# Patient Record
Sex: MALE
[De-identification: names, ages, dates, MRNs, and addresses within clinical notes are randomized per-mention and may not be internally consistent; named-entity substitution may affect disease eponyms.]

---

## 2018-10-15 ENCOUNTER — TRANSCRIPTION ENCOUNTER (OUTPATIENT)
Age: 63
End: 2018-10-15

## 2021-04-14 ENCOUNTER — APPOINTMENT (OUTPATIENT)
Dept: DISASTER EMERGENCY | Facility: OTHER | Age: 66
End: 2021-04-14
Payer: MEDICARE

## 2021-04-14 PROCEDURE — 0012A: CPT

## 2021-09-14 ENCOUNTER — TRANSCRIPTION ENCOUNTER (OUTPATIENT)
Age: 66
End: 2021-09-14

## 2021-09-17 ENCOUNTER — EMERGENCY (EMERGENCY)
Facility: HOSPITAL | Age: 66
LOS: 1 days | Discharge: HOME | End: 2021-09-17
Attending: EMERGENCY MEDICINE
Payer: MEDICARE

## 2021-09-17 VITALS
SYSTOLIC BLOOD PRESSURE: 155 MMHG | HEART RATE: 100 BPM | RESPIRATION RATE: 18 BRPM | OXYGEN SATURATION: 95 % | TEMPERATURE: 98 F | DIASTOLIC BLOOD PRESSURE: 95 MMHG

## 2021-09-17 PROCEDURE — 99283 EMERGENCY DEPT VISIT LOW MDM: CPT | Mod: GC

## 2021-09-17 PROCEDURE — 99283 EMERGENCY DEPT VISIT LOW MDM: CPT

## 2021-09-17 RX ORDER — OXYCODONE AND ACETAMINOPHEN 5; 325 MG/1; MG/1
2 TABLET ORAL ONCE
Refills: 0 | Status: DISCONTINUED | OUTPATIENT
Start: 2021-09-17 | End: 2021-09-17

## 2021-09-17 RX ADMIN — OXYCODONE AND ACETAMINOPHEN 2 TABLET(S): 5; 325 TABLET ORAL at 06:32

## 2021-09-17 NOTE — ED PROVIDER NOTE - CLINICAL SUMMARY MEDICAL DECISION MAKING FREE TEXT BOX
66M limited PMH with chronic LBP and sciatica, been worse as of late oscar the last week. Pain meds given (already on steroid) and referral to ortho spine given. Patient is now feeling improved and workup is unremarkable for any emergent process.   Patient/family was given full return precautions.  Patient was counseled on red flag symptoms such as fever, severe pain, or focal deficits and advised to return to the ED for these reasons or any reason that was concerning to them.  Patient/ family advised to make close follow up with their primary care provider and specialty clinics (as applicable) to follow up with this visit and continue investigation/treatment.  All questions were answered. Patient/family has shown adequate competence and understanding. Patient/family is agreeable to the plan.

## 2021-09-17 NOTE — ED PROVIDER NOTE - NS ED ROS FT
General: No fever, no massive bleeding  Head: No HA, no ongoing scalp bleed  ENT: no neck pain, no sore throat  Resp: No SOB, no hemoptysis  Cardiovascular: No CP, No LOC  GI: No abdominal pain, No blood in stool  : No dysuria, no hematuria   MSK: + back pain,+ rt limb pain  Skin: No painful or bleeding lesions  Neuro: No paresthesias, No focal deficits

## 2021-09-17 NOTE — ED PROVIDER NOTE - CARE PROVIDER_API CALL
Luis Daniel Whitney (DO)  Orthopaedic Surgery  [address] [place] [address], [address]  [place] [address]  Phone: [phone]  Fax: [phone]  Follow Up Time: 4-6 Days

## 2021-09-17 NOTE — ED ADULT TRIAGE NOTE - CHIEF COMPLAINT QUOTE
Pt c/o R hip pain that is chronic and sciatica pain. Pt states that he has been seeing doctors for this problem and was supposed to follow up with an orthopedist but was unable to. States that the pain has been getting worse and that he is concerned that his medication is not working.

## 2021-09-17 NOTE — ED ADULT NURSE NOTE - OBJECTIVE STATEMENT
c/o atraumatic R lower back pain radiating into R hip and down R leg. hx sciatica. seen at urgent care- placed on medrol dose pack. patient states "it's not working so I stopped" awaiting MD evaluation. will ctm

## 2021-09-17 NOTE — ED PROVIDER NOTE - OBJECTIVE STATEMENT
66M limited PMH with chronic LBP and sciatica, been worse as of late oscar the last week.  PCM gave steroid taper which states isn't helping but not taking anything else.  Has not followed up with ortho.  Otherwise denies pain elsewhere, incontinence, saddle anesthesia, bleeding, SOB, chest pain, abdominal pain or fevers.  Denies other pertinent medical problems.  Denies any  substance use.

## 2021-09-17 NOTE — ED PROVIDER NOTE - PHYSICAL EXAMINATION
General: NAD, well appearing  HEENT: Normocephalic, EOM intact  Neck: No apparent stiffness or JVD  Pulm: Chest wall symmetric and nontender, lungs clear to ascultation   Cardiac: Regular rate and regular rhythm  Abdomen: Nontender and nondistended  Skin: Skin is warm, dry and intact without rashes or lesions.  Neuro: No apparent motor or sensory deficits, ambulatory, normal reflexes  Psych: Alert, oriented, and cooperative

## 2021-09-27 ENCOUNTER — APPOINTMENT (OUTPATIENT)
Dept: ORTHOPEDIC SURGERY | Facility: CLINIC | Age: 66
End: 2021-09-27
Payer: MEDICARE

## 2021-09-27 VITALS — BODY MASS INDEX: 30.06 KG/M2 | WEIGHT: 210 LBS | HEIGHT: 70 IN

## 2021-09-27 DIAGNOSIS — Z83.3 FAMILY HISTORY OF DIABETES MELLITUS: ICD-10-CM

## 2021-09-27 DIAGNOSIS — Z78.9 OTHER SPECIFIED HEALTH STATUS: ICD-10-CM

## 2021-09-27 DIAGNOSIS — Z80.1 FAMILY HISTORY OF MALIGNANT NEOPLASM OF TRACHEA, BRONCHUS AND LUNG: ICD-10-CM

## 2021-09-27 DIAGNOSIS — M54.16 RADICULOPATHY, LUMBAR REGION: ICD-10-CM

## 2021-09-27 PROCEDURE — 72100 X-RAY EXAM L-S SPINE 2/3 VWS: CPT | Mod: 26

## 2021-09-27 PROCEDURE — 99204 OFFICE O/P NEW MOD 45 MIN: CPT | Mod: 25

## 2021-09-27 PROCEDURE — 20610 DRAIN/INJ JOINT/BURSA W/O US: CPT | Mod: RT

## 2021-09-27 NOTE — REASON FOR VISIT
[Initial Visit] : an initial visit for [Back Pain] : back pain [Radiculopathy] : radiculopathy [Other: ____] : [unfilled] [FreeTextEntry2] : lower back pain radiating into both legs

## 2021-09-27 NOTE — HISTORY OF PRESENT ILLNESS
[de-identified] : Devaughn is a 65 y/o male here with is wife for evaluation of chronic lumbar radiculopathy, noting he had falling on cement 14 years ago and since then he's had chronic low back, right hip pain radiating down right leg laterally. He notes pain in the left leg also, but not as severe and he's had chronic osteoarthritis since teenage years. He's had no conservative management, recent episode of pain happened 3 weeks ago without any known injury.  He was seen in the ED given Percocet which helped but stating not enough, and was also given medrol dose pack which didn't have any effect.  He notes pain in the right hip joint is worse than anywhere else and was told 14 years ago that his hip arthritis is severe.

## 2021-09-27 NOTE — DISCUSSION/SUMMARY
[de-identified] : Devaughn has been referred to PT for stretching, core strengthening exercises.  He has also been referred to Dr. Luna for right hip evaluation. Advised to attend PT for 6-8 weeks, if Dr. Luna determines right hip pain not contributing from the hip joint itself, rather from spine perspective and PT does not provide any pain review in the next 4-6 weeks, advised repeat visit for evaluation, at which point we can determine injection therapy vs MRI lumbar spine.

## 2021-09-27 NOTE — PROCEDURE
[de-identified] : right hip bursa injection give using 25g needle, skin prepped with chloraprep, medication with 1cc lidocaine and methylprednisone, needle retracted no blood products seen, patient tolerated injection.

## 2021-09-27 NOTE — PHYSICAL EXAM
[de-identified] : CONSTITUTIONAL: The patient is a very pleasant individual who is well-nourished and who appears stated age.\par PSYCHIATRIC: The patient is alert and oriented X 3 and in no apparent distress, and participates with orthopedic evaluation well.\par HEAD: Atraumatic and is nonsyndromic in appearance.\par EENT: No visible thyromegaly, EOMI.\par RESPIRATORY: Respiratory rate is regular, not dyspneic on examination.\par LYMPHATICS: There is no inguinal lymphadenopathy\par INTEGUMENTARY: Skin is clean, dry, and intact about the bilateral lower extremities and lumbar spine.\par VASCULAR: There is brisk capillary refill about the bilateral lower extremities.\par NEUROLOGIC: There are no pathologic reflexes. There is no decrease in sensation of the bilateral lower extremities on Wartenberg pinwheel/manual examination. Deep tendon reflexes are well maintained at 2+/4 of the bilateral lower extremities and are symmetric..\par MUSCULOSKELETAL: There is no visible muscular atrophy. Manual motor strength is well maintained in the bilateral lower extremities. Range of motion of lumbar spine is well maintained. The patient ambulates in a non-myelopathic manner. Negative tension sign and straight leg raise bilaterally. Quad extension, ankle dorsiflexion, EHL, plantar flexion, and ankle eversion are well preserved. Normal secondary orthopaedic exam of bilateral hips, greater trochanteric area, knees and ankles\par \par pain with palpation to the right hip busa, and pain with right hip external and internal rotation.  [de-identified] : lumbar xray today 9/27/21: lumbar degenerative disc disease at L4-5 and more severe at L5-S1.

## 2021-10-04 ENCOUNTER — APPOINTMENT (OUTPATIENT)
Dept: ORTHOPEDIC SURGERY | Facility: CLINIC | Age: 66
End: 2021-10-04
Payer: MEDICARE

## 2021-10-04 VITALS
WEIGHT: 210 LBS | HEIGHT: 70 IN | DIASTOLIC BLOOD PRESSURE: 95 MMHG | TEMPERATURE: 98.1 F | SYSTOLIC BLOOD PRESSURE: 145 MMHG | HEART RATE: 102 BPM | BODY MASS INDEX: 30.06 KG/M2

## 2021-10-04 PROCEDURE — 73502 X-RAY EXAM HIP UNI 2-3 VIEWS: CPT

## 2021-10-04 PROCEDURE — 99214 OFFICE O/P EST MOD 30 MIN: CPT

## 2021-10-04 NOTE — REVIEW OF SYSTEMS
[Joint Pain] : joint pain [Joint Stiffness] : joint stiffness [Joint Swelling] : joint swelling [Feeling Tired] : fatigue [Negative] : Heme/Lymph [FreeTextEntry9] : right hip/right LE

## 2021-10-04 NOTE — END OF VISIT
[FreeTextEntry3] : I, Heber Stapleton, acted solely as a scribe for Dr. Carlton Luna on this date 10/04/2021.

## 2021-10-04 NOTE — REASON FOR VISIT
[Initial Visit] : an initial visit for [Hip Pain] : hip pain [Spouse] : spouse [FreeTextEntry2] : Right hip pain

## 2021-10-04 NOTE — DISCUSSION/SUMMARY
[Medication Risks Reviewed] : Medication risks reviewed [de-identified] : 67 y/o M with severe radiculopathy pain. He has mild to moderate right hip osteoarthritis. We explained to him that his pain is due to his back and not his hip. He has been in severe pain for 5 weeks. We discussed going to the ER for his acute severe pain where they may or may not admit him for pain management. He is not interested in going to the hospital. He will go for an MRI of the lumbar spine to rule out an acute herniated disc. We sent him a rx for Percocet and Ibuprofen 800 mg. He understands that we will not be able to continue to prescribe Percocet. He will f/u after the MRI. Depending on the findings of the MRI will determine the next steps for his care.

## 2021-10-04 NOTE — HISTORY OF PRESENT ILLNESS
[Pain Location] : pain [5] : a minimum pain level of 5/10 [10] : a maximum pain level of 10/10 [Sitting] : worsened by sitting [Walking] : worsened by walking [Rest] : relieved by rest [___ wks] : [unfilled] week(s) ago [NSAIDs] : relieved by nonsteroidal anti-inflammatory drugs [de-identified] : 67 y/o M presents with right hip pain. He has pain with walking, lying down, and sitting. Pt reports a fall injury 14 years ago which caused back injury. He had chronic pain for 14 years, but notes that about 5 weeks ago the pain got severe. He has been using a walker for 1 month. He noted right hip pain and pain in the front of the knee as well as pain in the front of the shin. He is taking Aleve. He reports change in BM relating to constipation with acute lower back pain. He took several stool softeners and has a BM and noted some pain relief. Pt went to ED for severe pain for 2 weeks ago, he was given Percocet steroid summer and NSAIDs. He had a steroid injection the hip last week without relief. He reports right LE numbness. He never had a back MRI.  [de-identified] : lying down

## 2021-10-04 NOTE — PHYSICAL EXAM
[LE] : Sensory: Intact in bilateral lower extremities [ALL] : dorsalis pedis, posterior tibial, femoral, popliteal, and radial 2+ and symmetric bilaterally [Normal] : Alert and in no acute distress [Walker] : ambulates with walker [Poor Appearance] : well-appearing [de-identified] : GENERAL APPEARANCE: Well nourished and hydrated, pleasant, alert, and oriented x 3. Appears their stated age. \par HEENT: Normocephalic, extraocular eye motion intact. Nasal septum midline. Oral cavity clear. External auditory canal clear. \par RESPIRATORY: Breath sounds clear and audible in all lobes. No wheezing, No accessory muscle use.\par CARDIOVASCULAR: No apparent abnormalities. No lower leg edema. No varicosities. Pedal pulses are palpable.\par NEUROLOGIC: Sensation is normal, no muscle weakness in the upper or lower extremities.\par DERMATOLOGIC: No apparent skin lesions, moist, warm, no rash.\par SPINE: Cervical spine appears normal and moves freely; thoracic spine appears normal and moves freely; lumbosacral spine appears normal and moves freely, normal, nontender.\par MUSCULOSKELETAL: Hands, wrists, and elbows are normal and move freely, shoulders are normal and move freely.  [de-identified] : Severe pain with back ROM [de-identified] : 3V xray of the right hip done in the office today and reviewed by Dr. Carlton Luna demonstrates mild to moderate osteoarthritis

## 2021-10-12 ENCOUNTER — APPOINTMENT (OUTPATIENT)
Dept: MRI IMAGING | Facility: CLINIC | Age: 66
End: 2021-10-12
Payer: MEDICARE

## 2021-10-12 ENCOUNTER — OUTPATIENT (OUTPATIENT)
Dept: OUTPATIENT SERVICES | Facility: HOSPITAL | Age: 66
LOS: 1 days | End: 2021-10-12

## 2021-10-12 DIAGNOSIS — M54.16 RADICULOPATHY, LUMBAR REGION: ICD-10-CM

## 2021-10-12 PROCEDURE — 72148 MRI LUMBAR SPINE W/O DYE: CPT | Mod: 26

## 2021-10-22 ENCOUNTER — APPOINTMENT (OUTPATIENT)
Dept: ORTHOPEDIC SURGERY | Facility: CLINIC | Age: 66
End: 2021-10-22
Payer: MEDICARE

## 2021-10-22 VITALS — WEIGHT: 210 LBS | HEIGHT: 70 IN | BODY MASS INDEX: 30.06 KG/M2

## 2021-10-22 DIAGNOSIS — M40.209 UNSPECIFIED KYPHOSIS, SITE UNSPECIFIED: ICD-10-CM

## 2021-10-22 DIAGNOSIS — M47.814 SPONDYLOSIS W/OUT MYELOPATHY OR RADICULOPATHY, THORACIC REGION: ICD-10-CM

## 2021-10-22 DIAGNOSIS — M48.07 SPINAL STENOSIS, LUMBOSACRAL REGION: ICD-10-CM

## 2021-10-22 DIAGNOSIS — M54.6 PAIN IN THORACIC SPINE: ICD-10-CM

## 2021-10-22 DIAGNOSIS — M43.16 SPONDYLOLISTHESIS, LUMBAR REGION: ICD-10-CM

## 2021-10-22 PROCEDURE — 99214 OFFICE O/P EST MOD 30 MIN: CPT

## 2021-10-22 PROCEDURE — 72070 X-RAY EXAM THORAC SPINE 2VWS: CPT

## 2021-10-22 NOTE — HISTORY OF PRESENT ILLNESS
[de-identified] : Mr. MIRYAM FINCH  is a 66 year old male who presents with 7 weeks of right lumbar radiculopathy without any specific cause or trauma.  He has low back pain that radiates down from the back into the right hip, lateral thigh, and lateral calf.  He has done 2 medrol dose packs without any relief.  He takes 2 oxycodone for symptom control.  Denies any LE radicular symptoms.  Normal bowel and bladder control.   Denies any recent fevers, chills, sweats, weight loss, or infection.\par \par The patients past medical history, past surgical history, medications, allergies, and social history were reviewed by me today with the patient and documented accordingly.  In addition, the patient's family history, which is noncontributory to their visit, was also reviewed.\par

## 2021-10-22 NOTE — DISCUSSION/SUMMARY
[de-identified] : We discussed further treatment options both nonsurgical and surgical.  We discussed the role of a lumbar laminectomy and fusion.  He is not interested in surgical intervention at this time.  He would like to be evaluated for possible epidural injections.  Referral was given.  Follow-up afterwards or sooner with any changes or worsening of his symptoms.

## 2021-10-22 NOTE — PHYSICAL EXAM
[Antalgic] : antalgic [Stooped] : stooped [Walker] : ambulates with walker [de-identified] : Examination of the thoracic and lumbar spine reveals no midline tenderness palpation, step-offs, or skin lesions. Decreased range of motion with respect to flexion, extension, lateral bending, and rotation. No tenderness to palpation of the sciatic notch. No tenderness palpation of the bilateral greater trochanters. No pain with passive internal/external rotation of the hips. No instability of bilateral lower extremities.  Negative TABBY. Negative straight leg raise bilaterally. No bowstring. Negative femoral stretch. 5 out of 5 iliopsoas, hip abductors, hips adductors, quadriceps, hamstrings, gastrocsoleus, tibialis anterior, extensor hallucis longus, peroneals. Grossly intact sensation to light touch bilateral lower extremities. 1+ patellar and Achilles reflexes. Downgoing Babinski. No clonus. Intact proprioception. Palpable pulses. No skin lesion and no edema on the right and left lower extremities. [de-identified] : Lumbar MRI reveals L4-5 spondylolisthesis with some increased lateral recess and foraminal stenosis\par \par AP lateral thoracic x-rays with some spondylosis and kyphosis.

## 2021-12-28 ENCOUNTER — NON-APPOINTMENT (OUTPATIENT)
Age: 66
End: 2021-12-28

## 2021-12-28 ENCOUNTER — APPOINTMENT (OUTPATIENT)
Dept: INTERNAL MEDICINE | Facility: CLINIC | Age: 66
End: 2021-12-28
Payer: MEDICARE

## 2021-12-28 VITALS
HEIGHT: 70 IN | HEART RATE: 113 BPM | WEIGHT: 195 LBS | TEMPERATURE: 96.2 F | DIASTOLIC BLOOD PRESSURE: 88 MMHG | OXYGEN SATURATION: 96 % | SYSTOLIC BLOOD PRESSURE: 126 MMHG | BODY MASS INDEX: 27.92 KG/M2

## 2021-12-28 DIAGNOSIS — M54.16 RADICULOPATHY, LUMBAR REGION: ICD-10-CM

## 2021-12-28 DIAGNOSIS — Z82.49 FAMILY HISTORY OF ISCHEMIC HEART DISEASE AND OTHER DISEASES OF THE CIRCULATORY SYSTEM: ICD-10-CM

## 2021-12-28 DIAGNOSIS — Z00.00 ENCOUNTER FOR GENERAL ADULT MEDICAL EXAMINATION W/OUT ABNORMAL FINDINGS: ICD-10-CM

## 2021-12-28 DIAGNOSIS — Z80.52 FAMILY HISTORY OF MALIGNANT NEOPLASM OF BLADDER: ICD-10-CM

## 2021-12-28 PROCEDURE — 36415 COLL VENOUS BLD VENIPUNCTURE: CPT

## 2021-12-28 PROCEDURE — 93000 ELECTROCARDIOGRAM COMPLETE: CPT

## 2021-12-28 PROCEDURE — G0438: CPT

## 2021-12-28 RX ORDER — OXYCODONE AND ACETAMINOPHEN 5; 325 MG/1; MG/1
5-325 TABLET ORAL
Qty: 20 | Refills: 0 | Status: DISCONTINUED | COMMUNITY
Start: 2021-10-04 | End: 2021-12-28

## 2021-12-28 RX ORDER — NAPROXEN SODIUM 220 MG
TABLET ORAL
Refills: 0 | Status: DISCONTINUED | COMMUNITY
End: 2021-12-28

## 2021-12-28 RX ORDER — METHYLPREDNISOLONE 4 MG/1
4 TABLET ORAL
Qty: 1 | Refills: 0 | Status: DISCONTINUED | COMMUNITY
Start: 2021-10-15 | End: 2021-12-28

## 2021-12-28 RX ORDER — IBUPROFEN 800 MG/1
800 TABLET, FILM COATED ORAL 3 TIMES DAILY
Qty: 60 | Refills: 1 | Status: DISCONTINUED | COMMUNITY
Start: 2021-10-04 | End: 2021-12-28

## 2021-12-28 RX ORDER — TIZANIDINE 4 MG/1
4 TABLET ORAL
Qty: 60 | Refills: 0 | Status: DISCONTINUED | COMMUNITY
Start: 2021-10-25

## 2021-12-29 DIAGNOSIS — Z12.5 ENCOUNTER FOR SCREENING FOR MALIGNANT NEOPLASM OF PROSTATE: ICD-10-CM

## 2021-12-29 DIAGNOSIS — Z13.220 ENCOUNTER FOR SCREENING FOR LIPOID DISORDERS: ICD-10-CM

## 2021-12-29 LAB
ALBUMIN SERPL ELPH-MCNC: 4.6 G/DL
ALP BLD-CCNC: 149 U/L
ALT SERPL-CCNC: 17 U/L
ANION GAP SERPL CALC-SCNC: 18 MMOL/L
AST SERPL-CCNC: 13 U/L
BASOPHILS # BLD AUTO: 0.12 K/UL
BASOPHILS NFR BLD AUTO: 1.2 %
BILIRUB SERPL-MCNC: 0.9 MG/DL
BUN SERPL-MCNC: 12 MG/DL
CALCIUM SERPL-MCNC: 10.2 MG/DL
CHLORIDE SERPL-SCNC: 102 MMOL/L
CHOLEST SERPL-MCNC: 271 MG/DL
CO2 SERPL-SCNC: 20 MMOL/L
CREAT SERPL-MCNC: 0.59 MG/DL
EOSINOPHIL # BLD AUTO: 0.08 K/UL
EOSINOPHIL NFR BLD AUTO: 0.8 %
ESTIMATED AVERAGE GLUCOSE: 303 MG/DL
GLUCOSE SERPL-MCNC: 305 MG/DL
HBA1C MFR BLD HPLC: 12.2 %
HCT VFR BLD CALC: 50 %
HDLC SERPL-MCNC: 43 MG/DL
HGB BLD-MCNC: 17 G/DL
IMM GRANULOCYTES NFR BLD AUTO: 0.6 %
LDLC SERPL CALC-MCNC: 156 MG/DL
LYMPHOCYTES # BLD AUTO: 2.16 K/UL
LYMPHOCYTES NFR BLD AUTO: 21.8 %
MAN DIFF?: NORMAL
MCHC RBC-ENTMCNC: 31.8 PG
MCHC RBC-ENTMCNC: 34 GM/DL
MCV RBC AUTO: 93.6 FL
MONOCYTES # BLD AUTO: 0.68 K/UL
MONOCYTES NFR BLD AUTO: 6.9 %
NEUTROPHILS # BLD AUTO: 6.8 K/UL
NEUTROPHILS NFR BLD AUTO: 68.7 %
NONHDLC SERPL-MCNC: 228 MG/DL
PLATELET # BLD AUTO: 304 K/UL
POTASSIUM SERPL-SCNC: 4.2 MMOL/L
PROT SERPL-MCNC: 6.9 G/DL
PSA SERPL-MCNC: 5.76 NG/ML
RBC # BLD: 5.34 M/UL
RBC # FLD: 13.7 %
SODIUM SERPL-SCNC: 140 MMOL/L
TRIGL SERPL-MCNC: 359 MG/DL
WBC # FLD AUTO: 9.9 K/UL

## 2021-12-29 RX ORDER — ASPIRIN 81 MG/1
81 TABLET, COATED ORAL
Refills: 0 | Status: ACTIVE | COMMUNITY
Start: 2021-12-29

## 2021-12-29 NOTE — ASSESSMENT
[FreeTextEntry1] : -PMH: Generalized OA (Pre-dominantly b/l Knee's), DDD (Mod R. L4-L5 Foraminal Narrowing)\par -SH: Significant other. No children. Current smoker. Occasional EtOH use. \par \par MIRYAM is a 66 year M whom is here today for an annual well check and to establish care w/ a new PMD\par \par Specialists Involved:\par -Prior PMD: Last seen 5-6 yrs ago\par -Pain Mgt: Dr. Medina \par \par EKG obtained in office today demonstrates Sinus Tachycardia. normal axis/Intervals. Poor-R-wave progression. Normal EKG. Cardio consult advuised\par \par -F/u labs drawn in office today\par -Further recs pending lab results\par -Call Ins Co regarding Shingles & PPSV23 vaccine coverage\par -F/u w/ Gi (Colonoscopy)\par -F/u Lung CA CT Screening\par -RTO 6mo for routine f/u & labs or sooner if needed

## 2021-12-29 NOTE — HEALTH RISK ASSESSMENT
[Very Good] : ~his/her~  mood as very good [Current] : Current [Reviewed no changes] : Reviewed, no changes [20 or more] : 20 or more [Yes] : Yes [Monthly or less (1 pt)] : Monthly or less (1 point) [1 or 2 (0 pts)] : 1 or 2 (0 points) [Never (0 pts)] : Never (0 points) [No] : In the past 12 months have you used drugs other than those required for medical reasons? No [No falls in past year] : Patient reported no falls in the past year [0] : 2) Feeling down, depressed, or hopeless: Not at all (0) [PHQ-2 Negative - No further assessment needed] : PHQ-2 Negative - No further assessment needed [HIV test declined] : HIV test declined [Hepatitis C test declined] : Hepatitis C test declined [None] : None [With Significant Other] : lives with significant other [Retired] : retired [Significant Other] : lives with significant other [# Of Children ___] : has [unfilled] children [Fully functional (bathing, dressing, toileting, transferring, walking, feeding)] : Fully functional (bathing, dressing, toileting, transferring, walking, feeding) [Fully functional (using the telephone, shopping, preparing meals, housekeeping, doing laundry, using] : Fully functional and needs no help or supervision to perform IADLs (using the telephone, shopping, preparing meals, housekeeping, doing laundry, using transportation, managing medications and managing finances) [Audit-CScore] : 1 [[facility]] : 10 [QPN7Omlqv] : 0 [Change in mental status noted] : No change in mental status noted [Reports changes in hearing] : Reports no changes in hearing [Reports changes in vision] : Reports no changes in vision [ColonoscopyComments] : GI referral given today [AdvancecareDate] : 12/21

## 2021-12-29 NOTE — ADDENDUM
[FreeTextEntry1] : Elevated alkaline phosphatase level likely due to fatty liver disease. We'll repeat CMP along with a GGT at next visit\par \par Patient not seen by a primary care doctor in several years\par Labs obtained in the office yesterday demonstrate poorly controlled type 2 diabetes with an A1c of 12.2 indicating average blood glucose of 300.\par Start metformin 500 mg b.i.d.\par Start Jardiance 10 mg q.d.\par Start aspirin 81 mg once daily for primary prevention of cardiovascular disease given the increased risk associated with diabetes\par Start Crestor 10 mg h.s. to lower cholesterol given known increased cardiovascular risk associated with diabetes\par \par Diabetic educator referral strongly recommended. Will send patient's diabetic glucose monitoring supplies to his pharmacy. I would like for him to start checking at least once a day in the morning before eating anything. Goes to achieve fasting blood glucose of less than 130.\par \par Diet and weight loss strongly advised\par \par Patient's PSA value also elevated. We will repeat this at next followup visit\par \par Pt to f/u w/ me in 2-4 weeks to address these new meds and lab findings. Sooner if needed

## 2021-12-29 NOTE — HISTORY OF PRESENT ILLNESS
[FreeTextEntry1] : establish care\par annual well check  [de-identified] : -PMH: Generalized OA (Pre-dominantly b/l Knee's), DDD (Mod R. L4-L5 Foraminal Narrowing)\par -SH: Significant other. No children. Current smoker. Occasional EtOH use. \par \par MIRYAM is a 66 year M whom is here today for an annual well check and to establish care w/ a new PMD\par Today, pt reports feeling well but mentions that he has been having epigastric abdominal for the past 2mo. Pain is constant. Worsened since onset. Symptoms are exacerbated by spicy foods, italian foods. He also reports heart burn related symptoms. Has tried OTC antacids w/ minimal relief. Never had anything like this before. No alleviating symptoms. Pain is not worsened with exertion. Admits to darkened stools. He does admit tthat due to his low back pain and knee OA he has been on a lot of PO NSAIDs & Steroids\par \par Specialists Involved:\par -Prior PMD: Last seen 5-6 yrs ago\par -Pain Mgt: Dr. Medina \par \par -Vaccines: Needs PPSV 23, Shingles, TDap (Declines at this time)\par -Colonoscopy: 2011. GI referral given today\par -Lung CT CA Screening: Script given today\par -FH of Prostate, Colon, breast or ovarian CA: None known\par \par -Generalized OA (Pre-dominantly b/l Knee's), DDD (Mod R. L4-L5 Foraminal Narrowing): S/p Epidural Injection. Following w/ Pain Mgt. \par -Long Standing Smoking Hx: On and off over the last 40yrs. Currently quit 1mo ago.

## 2022-01-17 ENCOUNTER — APPOINTMENT (OUTPATIENT)
Dept: GASTROENTEROLOGY | Facility: CLINIC | Age: 67
End: 2022-01-17
Payer: MEDICARE

## 2022-01-17 VITALS
HEIGHT: 70 IN | DIASTOLIC BLOOD PRESSURE: 90 MMHG | SYSTOLIC BLOOD PRESSURE: 130 MMHG | HEART RATE: 100 BPM | WEIGHT: 190 LBS | BODY MASS INDEX: 27.2 KG/M2

## 2022-01-17 DIAGNOSIS — Z86.39 PERSONAL HISTORY OF OTHER ENDOCRINE, NUTRITIONAL AND METABOLIC DISEASE: ICD-10-CM

## 2022-01-17 DIAGNOSIS — Z78.9 OTHER SPECIFIED HEALTH STATUS: ICD-10-CM

## 2022-01-17 DIAGNOSIS — F17.210 NICOTINE DEPENDENCE, CIGARETTES, UNCOMPLICATED: ICD-10-CM

## 2022-01-17 PROCEDURE — 99204 OFFICE O/P NEW MOD 45 MIN: CPT

## 2022-01-17 NOTE — HISTORY OF PRESENT ILLNESS
[None] : had no significant interval events [Heartburn] : denies heartburn [Nausea] : denies nausea [Vomiting] : denies vomiting [Diarrhea] : denies diarrhea [Constipation] : denies constipation [Yellow Skin Or Eyes (Jaundice)] : denies jaundice [Abdominal Swelling] : denies abdominal swelling [Rectal Pain] : denies rectal pain [Abdominal Pain] : abdominal pain [Wt Gain ___ Lbs] : no recent weight gain [Wt Loss ___ Lbs] : no recent weight loss [GERD] : no gastroesophageal reflux disease [Hiatus Hernia] : no hiatus hernia [Peptic Ulcer Disease] : no peptic ulcer disease [Pancreatitis] : no pancreatitis [Cholelithiasis] : no cholelithiasis [Kidney Stone] : no kidney stone [Inflammatory Bowel Disease] : no inflammatory bowel disease [Irritable Bowel Syndrome] : no irritable bowel syndrome [Diverticulitis] : no diverticulitis [Alcohol Abuse] : no alcohol abuse [Malignancy] : no malignancy [Abdominal Surgery] : no abdominal surgery [Appendectomy] : no appendectomy [Cholecystectomy] : no cholecystectomy [de-identified] : This is the patient's first visit here [de-identified] : Patient presents for initial evaluation of chronic epigastric pain which has been present for the past 3 to 4 months.  He has been having issues with his lower back and has been on various nonsteroidal anti-inflammatory agents and may very well have an NSAID induced ulcer or gastropathy.  He has had no previous upper endoscopies but did have a negative colonoscopy done roughly 8 years ago.  He has no lower GI symptoms or complaints and presently denies melena or hematochezia or nausea or vomiting or hematemesis.  He is presently taking omeprazole 40 mg twice daily with some relief of his epigastric pain which still persists.  He has no associated reflux symptoms.

## 2022-01-17 NOTE — ASSESSMENT
[FreeTextEntry1] : Impression: Chronic epigastric pain with recent NSAID use rule out NSAID induced gastropathy and/or ulcer disease.\par \par Recommendations: Upper endoscopy was advised for further evaluation of the above.  The risk versus benefits of upper endoscopy and intravenous sedation, and alternative testing such as upper GI series, were individually explained to the patient today who appeared to understand all of the above and was agreeable to proceeding with upper endoscopy.  His ASA classification is 2 and he is medically optimized for the proposed upper endoscopy and appeared to understand all of the above instructions, information, and management plan.

## 2022-02-03 ENCOUNTER — APPOINTMENT (OUTPATIENT)
Dept: INTERNAL MEDICINE | Facility: CLINIC | Age: 67
End: 2022-02-03
Payer: MEDICARE

## 2022-02-03 ENCOUNTER — RESULT CHARGE (OUTPATIENT)
Age: 67
End: 2022-02-03

## 2022-02-03 VITALS
HEIGHT: 70 IN | RESPIRATION RATE: 16 BRPM | BODY MASS INDEX: 27.97 KG/M2 | TEMPERATURE: 97.2 F | HEART RATE: 91 BPM | OXYGEN SATURATION: 93 % | DIASTOLIC BLOOD PRESSURE: 98 MMHG | SYSTOLIC BLOOD PRESSURE: 148 MMHG | WEIGHT: 195.38 LBS

## 2022-02-03 DIAGNOSIS — Z86.79 PERSONAL HISTORY OF OTHER DISEASES OF THE CIRCULATORY SYSTEM: ICD-10-CM

## 2022-02-03 DIAGNOSIS — Z12.2 ENCOUNTER FOR SCREENING FOR MALIGNANT NEOPLASM OF RESPIRATORY ORGANS: ICD-10-CM

## 2022-02-03 PROCEDURE — 99214 OFFICE O/P EST MOD 30 MIN: CPT | Mod: 25

## 2022-02-03 PROCEDURE — 36415 COLL VENOUS BLD VENIPUNCTURE: CPT

## 2022-02-03 PROCEDURE — 82962 GLUCOSE BLOOD TEST: CPT

## 2022-02-03 NOTE — HISTORY OF PRESENT ILLNESS
[FreeTextEntry1] : f/u T2DM, HLD [de-identified] : -PMH: T2DM, HLD, Generalized OA (Pre-dominantly b/l Knee's), DDD (Mod R. L4-L5 Foraminal Narrowing)\par -SH: Significant other. No children. Current smoker. Occasional EtOH use. \par \par MIRYAM is a 66 year M whom is here today for f/u T2DM, HLD\par Today, pt reports that he continues to have epigastric abdominal pain. Is scheduled for an EGD in 2 weeks \par \par -Epigastric Abdominal Pain: In setting of recent NSAID use. Started on PPI TX 12/2021. GI Consult 1/17/22. Plan is EGD\par \par -T2DM: Now on metformin 500mg BID & Jardiance 10mg QD. On ASA & Statin. (12/2021) A1c 12.2. Was referred to Diabetic educator and provided w/ diabetic glucose monitoring supplies. Needs to consult w/ Optho. \par -HLD: Now on Crestor 10mg HS. \par \par -Generalized OA (Pre-dominantly b/l Knee's), DDD (Mod R. L4-L5 Foraminal Narrowing): S/p Epidural Injection. Following w/ Pain Mgt. \par -Long Standing Smoking Hx: On and off over the last 40yrs. Currently quit 1mo ago.

## 2022-02-03 NOTE — ASSESSMENT
[FreeTextEntry1] : -PMH: T2DM, HLD, Generalized OA (Pre-dominantly b/l Knee's), DDD (Mod R. L4-L5 Foraminal Narrowing)\par -SH: Significant other. No children. Current smoker. Occasional EtOH use. \par \par MIRYAM is a 66 year M whom is here today for f/u T2DM, HLD\par \par Specialists Involved:\par -Pain Mgt: Dr. Medina \par -GI: Dr. Miguel Bedoya \par -Cardio: Consult pending\par \par -F/u labs drawn in office today\par -Further recs pending lab results\par -Still needs to consult w/ Cardio (Sinus Tachycardia, DM, Smoker)\par -Still needs to obtain Lung CA CT Screening\par -Still needs to call Ins Co regarding Shingles & PPSV23 vaccine coverage\par -Continue f/u w/ GI (Colonoscopy, EGD, Epigastric Abd Pain)\par -RTO 2mo for routine f/u & labs or sooner if needed

## 2022-02-16 ENCOUNTER — TRANSCRIPTION ENCOUNTER (OUTPATIENT)
Age: 67
End: 2022-02-16

## 2022-02-17 ENCOUNTER — OUTPATIENT (OUTPATIENT)
Dept: OUTPATIENT SERVICES | Facility: HOSPITAL | Age: 67
LOS: 1 days | End: 2022-02-17
Payer: MEDICARE

## 2022-02-17 ENCOUNTER — RESULT REVIEW (OUTPATIENT)
Age: 67
End: 2022-02-17

## 2022-02-17 ENCOUNTER — APPOINTMENT (OUTPATIENT)
Dept: GASTROENTEROLOGY | Facility: GI CENTER | Age: 67
End: 2022-02-17
Payer: MEDICARE

## 2022-02-17 DIAGNOSIS — K20.80 OTHER ESOPHAGITIS WITHOUT BLEEDING: ICD-10-CM

## 2022-02-17 DIAGNOSIS — R10.13 EPIGASTRIC PAIN: ICD-10-CM

## 2022-02-17 LAB — GLUCOSE BLDC GLUCOMTR-MCNC: 189 MG/DL — HIGH (ref 70–99)

## 2022-02-17 PROCEDURE — 88305 TISSUE EXAM BY PATHOLOGIST: CPT

## 2022-02-17 PROCEDURE — 82962 GLUCOSE BLOOD TEST: CPT

## 2022-02-17 PROCEDURE — 88342 IMHCHEM/IMCYTCHM 1ST ANTB: CPT | Mod: 26

## 2022-02-17 PROCEDURE — 88305 TISSUE EXAM BY PATHOLOGIST: CPT | Mod: 26

## 2022-02-17 PROCEDURE — 43239 EGD BIOPSY SINGLE/MULTIPLE: CPT

## 2022-02-17 PROCEDURE — 88342 IMHCHEM/IMCYTCHM 1ST ANTB: CPT

## 2022-02-19 ENCOUNTER — RX RENEWAL (OUTPATIENT)
Age: 67
End: 2022-02-19

## 2022-02-23 LAB — SURGICAL PATHOLOGY STUDY: SIGNIFICANT CHANGE UP

## 2022-03-17 ENCOUNTER — APPOINTMENT (OUTPATIENT)
Dept: MRI IMAGING | Facility: CLINIC | Age: 67
End: 2022-03-17
Payer: MEDICARE

## 2022-03-17 ENCOUNTER — OUTPATIENT (OUTPATIENT)
Dept: OUTPATIENT SERVICES | Facility: HOSPITAL | Age: 67
LOS: 1 days | End: 2022-03-17

## 2022-03-17 DIAGNOSIS — M25.561 PAIN IN RIGHT KNEE: ICD-10-CM

## 2022-03-17 PROCEDURE — 73721 MRI JNT OF LWR EXTRE W/O DYE: CPT | Mod: 26,LT,MH,76

## 2022-03-22 ENCOUNTER — RX RENEWAL (OUTPATIENT)
Age: 67
End: 2022-03-22

## 2022-03-23 ENCOUNTER — RX RENEWAL (OUTPATIENT)
Age: 67
End: 2022-03-23

## 2022-03-28 ENCOUNTER — RX RENEWAL (OUTPATIENT)
Age: 67
End: 2022-03-28

## 2022-03-29 ENCOUNTER — RX RENEWAL (OUTPATIENT)
Age: 67
End: 2022-03-29

## 2022-04-05 ENCOUNTER — APPOINTMENT (OUTPATIENT)
Dept: ENDOCRINOLOGY | Facility: CLINIC | Age: 67
End: 2022-04-05
Payer: MEDICARE

## 2022-04-05 VITALS
RESPIRATION RATE: 16 BRPM | DIASTOLIC BLOOD PRESSURE: 95 MMHG | TEMPERATURE: 98.1 F | WEIGHT: 198 LBS | HEART RATE: 93 BPM | HEIGHT: 70 IN | BODY MASS INDEX: 28.35 KG/M2 | SYSTOLIC BLOOD PRESSURE: 151 MMHG | OXYGEN SATURATION: 96 %

## 2022-04-05 PROCEDURE — 99204 OFFICE O/P NEW MOD 45 MIN: CPT

## 2022-04-05 RX ORDER — LANCETS
EACH MISCELLANEOUS
Qty: 100 | Refills: 3 | Status: ACTIVE | COMMUNITY
Start: 2022-04-05 | End: 1900-01-01

## 2022-04-05 NOTE — HISTORY OF PRESENT ILLNESS
[FreeTextEntry1] : 65 yo M with a dx of Type 2 DM x 3 mo\par Diet- low carb\par Ex- little, due to arthritis and sciatica\par Retinopathy- neg\par Neuropathy- feet feel numb

## 2022-04-05 NOTE — REVIEW OF SYSTEMS
[Recent Weight Loss (___ Lbs)] : recent weight loss: [unfilled] lbs [Erectile Dysfunction] : erectile dysfunction [Joint Stiffness] : joint stiffness [Back Pain] : back pain [Pain/Numbness of Digits] : pain/numbness of digits [Negative] : Heme/Lymph [Fatigue] : no fatigue [Decreased Appetite] : appetite not decreased [Recent Weight Gain (___ Lbs)] : no recent weight gain [Chest Pain] : no chest pain [Palpitations] : no palpitations [Polydipsia] : no polydipsia [FreeTextEntry2] : lost wt slowly over 2 yrs

## 2022-04-05 NOTE — ASSESSMENT
[Diabetic Medications] : Risks and benefits of diabetic medications were discussed [FreeTextEntry1] : 1- diet and ex discussed- will make appt with dietary\par 2-labs reviewed- A1c- 8.5, LDL- 156\par 3- cont Jardiance and increase metformin to 1000u bid\par 4- cont Crestor and low fat diet\par 5- Contour next meter given- check q AM\par \par Ret 3 mo\par \par

## 2022-04-07 ENCOUNTER — NON-APPOINTMENT (OUTPATIENT)
Age: 67
End: 2022-04-07

## 2022-04-07 ENCOUNTER — APPOINTMENT (OUTPATIENT)
Dept: INTERNAL MEDICINE | Facility: CLINIC | Age: 67
End: 2022-04-07

## 2022-04-07 VITALS
BODY MASS INDEX: 27.35 KG/M2 | SYSTOLIC BLOOD PRESSURE: 142 MMHG | HEART RATE: 100 BPM | OXYGEN SATURATION: 97 % | DIASTOLIC BLOOD PRESSURE: 90 MMHG | TEMPERATURE: 97 F | HEIGHT: 70 IN | WEIGHT: 191 LBS | RESPIRATION RATE: 16 BRPM

## 2022-04-07 RX ORDER — METFORMIN HYDROCHLORIDE 500 MG/1
500 TABLET, COATED ORAL
Qty: 180 | Refills: 0 | Status: DISCONTINUED | COMMUNITY
Start: 2021-12-29 | End: 2022-04-07

## 2022-04-07 RX ORDER — BLOOD-GLUCOSE METER
EACH MISCELLANEOUS
Qty: 30 | Refills: 0 | Status: DISCONTINUED | COMMUNITY
Start: 2022-02-03 | End: 2022-04-07

## 2022-04-07 RX ORDER — BLOOD-GLUCOSE METER
W/DEVICE EACH MISCELLANEOUS
Qty: 30 | Refills: 0 | Status: DISCONTINUED | COMMUNITY
Start: 2022-02-03 | End: 2022-04-07

## 2022-04-07 RX ORDER — BLOOD SUGAR DIAGNOSTIC
STRIP MISCELLANEOUS
Qty: 30 | Refills: 0 | Status: DISCONTINUED | COMMUNITY
Start: 2022-02-03 | End: 2022-04-07

## 2022-04-08 DIAGNOSIS — R74.8 ABNORMAL LEVELS OF OTHER SERUM ENZYMES: ICD-10-CM

## 2022-04-08 LAB
ALBUMIN SERPL ELPH-MCNC: 5 G/DL
ALP BLD-CCNC: 114 U/L
ALT SERPL-CCNC: 16 U/L
ANION GAP SERPL CALC-SCNC: 15 MMOL/L
AST SERPL-CCNC: 16 U/L
BILIRUB SERPL-MCNC: 1 MG/DL
BUN SERPL-MCNC: 13 MG/DL
CALCIUM SERPL-MCNC: 9.9 MG/DL
CHLORIDE SERPL-SCNC: 104 MMOL/L
CHOLEST SERPL-MCNC: 134 MG/DL
CO2 SERPL-SCNC: 23 MMOL/L
CREAT SERPL-MCNC: 0.68 MG/DL
EGFR: 101 ML/MIN/1.73M2
ESTIMATED AVERAGE GLUCOSE: 177 MG/DL
GGT SERPL-CCNC: 25 U/L
GLUCOSE SERPL-MCNC: 148 MG/DL
HBA1C MFR BLD HPLC: 7.8 %
HDLC SERPL-MCNC: 38 MG/DL
LDLC SERPL CALC-MCNC: 62 MG/DL
NONHDLC SERPL-MCNC: 96 MG/DL
POTASSIUM SERPL-SCNC: 4.1 MMOL/L
PROT SERPL-MCNC: 7.5 G/DL
SODIUM SERPL-SCNC: 142 MMOL/L
TRIGL SERPL-MCNC: 173 MG/DL

## 2022-04-11 ENCOUNTER — NON-APPOINTMENT (OUTPATIENT)
Age: 67
End: 2022-04-11

## 2022-04-15 ENCOUNTER — APPOINTMENT (OUTPATIENT)
Dept: ENDOCRINOLOGY | Facility: CLINIC | Age: 67
End: 2022-04-15
Payer: MEDICARE

## 2022-04-15 PROCEDURE — 97802 MEDICAL NUTRITION INDIV IN: CPT

## 2022-04-18 RX ORDER — LANCETS
EACH MISCELLANEOUS
Qty: 1 | Refills: 3 | Status: ACTIVE | COMMUNITY
Start: 2022-04-15 | End: 1900-01-01

## 2022-04-20 ENCOUNTER — RX RENEWAL (OUTPATIENT)
Age: 67
End: 2022-04-20

## 2022-05-05 NOTE — ED PROVIDER NOTE - ATTENDING CONTRIBUTION TO CARE
30-Oct-2021 I personally saw the patient with the PA, and completed the key components of the history and physical exam. I then discussed the management plan with the PA.   gen in nad resp clear cardiac no murmur abd soft neuro intact msk no midline back ttp   agree with pa plan of care I personally saw the patient with the resident, and completed the key components of the history and physical exam. I then discussed the management plan with the resident.   gen in nad resp clear cardiac no murmur abd soft neuro intact msk no midline back ttp   agree with pa plan of care

## 2022-05-13 ENCOUNTER — APPOINTMENT (OUTPATIENT)
Dept: ENDOCRINOLOGY | Facility: CLINIC | Age: 67
End: 2022-05-13

## 2022-05-17 ENCOUNTER — RX RENEWAL (OUTPATIENT)
Age: 67
End: 2022-05-17

## 2022-06-20 ENCOUNTER — RX RENEWAL (OUTPATIENT)
Age: 67
End: 2022-06-20

## 2022-06-27 ENCOUNTER — RX RENEWAL (OUTPATIENT)
Age: 67
End: 2022-06-27

## 2022-07-11 ENCOUNTER — APPOINTMENT (OUTPATIENT)
Dept: ENDOCRINOLOGY | Facility: CLINIC | Age: 67
End: 2022-07-11

## 2022-07-11 VITALS
HEART RATE: 94 BPM | RESPIRATION RATE: 16 BRPM | TEMPERATURE: 98 F | HEIGHT: 70 IN | OXYGEN SATURATION: 97 % | BODY MASS INDEX: 25.2 KG/M2 | WEIGHT: 176 LBS

## 2022-07-11 PROCEDURE — 99215 OFFICE O/P EST HI 40 MIN: CPT

## 2022-07-13 ENCOUNTER — APPOINTMENT (OUTPATIENT)
Dept: ENDOCRINOLOGY | Facility: CLINIC | Age: 67
End: 2022-07-13

## 2022-07-22 ENCOUNTER — RX RENEWAL (OUTPATIENT)
Age: 67
End: 2022-07-22

## 2022-07-25 ENCOUNTER — APPOINTMENT (OUTPATIENT)
Dept: INTERNAL MEDICINE | Facility: CLINIC | Age: 67
End: 2022-07-25

## 2022-07-25 VITALS
HEART RATE: 90 BPM | OXYGEN SATURATION: 97 % | BODY MASS INDEX: 25.2 KG/M2 | DIASTOLIC BLOOD PRESSURE: 90 MMHG | HEIGHT: 70 IN | TEMPERATURE: 97.2 F | SYSTOLIC BLOOD PRESSURE: 130 MMHG | WEIGHT: 176 LBS

## 2022-07-25 DIAGNOSIS — K29.00 ACUTE GASTRITIS W/OUT BLEEDING: ICD-10-CM

## 2022-07-25 DIAGNOSIS — M70.71 OTHER BURSITIS OF HIP, RIGHT HIP: ICD-10-CM

## 2022-07-25 DIAGNOSIS — Z87.19 PERSONAL HISTORY OF OTHER DISEASES OF THE DIGESTIVE SYSTEM: ICD-10-CM

## 2022-07-25 PROCEDURE — 99214 OFFICE O/P EST MOD 30 MIN: CPT

## 2022-07-25 NOTE — ASSESSMENT
[FreeTextEntry1] : -PMH: T2DM, HLD, GERD w/ Manzano's, Generalized OA (Pre-dominantly b/l Knee's), DDD (Mod R. L4-L5 Foraminal Narrowing)\par -SH: Significant other. No children. Current smoker. Occasional EtOH use. \par \par MIRYAM is a 67 year M whom is here today for f/u T2DM, HLD\par \par Specialists Involved:\par -Pain Mgt: Dr. Medina \par -GI: Dr. Miguel Bedoya \par -Endo: Dr. Mary Sosa\par -Cardio: Consult pending\par -Uro: Consult pending\par \par -Still needs to consult w/ Uro (Elevated PSA)\par -Still needs to consult w/ Cardio (Sinus Tachycardia, DM, Smoker)\par -Still needs to obtain Lung CA CT Screening\par -Still needs to obtain recommended Shingles & [id number] vaccine \par -Continue f/u w/ GI (Manzano's)\par -Continue f/u w/ Endo (DM)\par -RTO January/February 2023 for CPE or sooner if needed

## 2022-07-25 NOTE — HISTORY OF PRESENT ILLNESS
[FreeTextEntry1] : f/u T2DM, HLD, gerd [de-identified] : -PMH: T2DM, HLD, GERD w/ Manzano's, Generalized OA (Pre-dominantly b/l Knee's), DDD (Mod R. L4-L5 Foraminal Narrowing)\par -SH: Significant other. No children. Current smoker. Occasional EtOH use. \par \par MIRYAM is a 67 year M whom is here today for f/u T2DM, HLD\par Today, pt reports feeling well\par \par -T2DM: Remains on Metformin 1000mg BID & Jardiance 10mg QD. On ASA & Statin. Compliant w/ BG monitoring. On ASA & Statin. (4/2022) A1c 7.8. Still Needs to consult w/ Optho. Follows w/ Endo. \par -HLD: Remains on Crestor 10mg HS. \par \par -GERD w/ Manzano's: Remains on Omeprazole 40mg BID. (2/2022) EGD: Gastritis & Manzano's.\par \par -Generalized OA (Pre-dominantly b/l Knee's), DDD (Mod R. L4-L5 Foraminal Narrowing): S/p Epidural Injection. Following w/ Pain Mgt. \par -Long Standing Smoking Hx: On and off over the last 40yrs. Currently quit 1mo ago.

## 2022-09-22 ENCOUNTER — RX RENEWAL (OUTPATIENT)
Age: 67
End: 2022-09-22

## 2022-09-26 LAB — PSA SERPL-MCNC: 3.15 NG/ML

## 2022-09-27 ENCOUNTER — RX RENEWAL (OUTPATIENT)
Age: 67
End: 2022-09-27

## 2022-09-29 ENCOUNTER — APPOINTMENT (OUTPATIENT)
Dept: UROLOGY | Facility: CLINIC | Age: 67
End: 2022-09-29

## 2022-09-29 VITALS — HEART RATE: 92 BPM | DIASTOLIC BLOOD PRESSURE: 89 MMHG | SYSTOLIC BLOOD PRESSURE: 146 MMHG | OXYGEN SATURATION: 97 %

## 2022-09-29 DIAGNOSIS — Z78.9 OTHER SPECIFIED HEALTH STATUS: ICD-10-CM

## 2022-09-29 PROCEDURE — 99203 OFFICE O/P NEW LOW 30 MIN: CPT

## 2022-09-29 NOTE — PHYSICAL EXAM
[General Appearance - Well Developed] : well developed [General Appearance - Well Nourished] : well nourished [Normal Appearance] : normal appearance [Well Groomed] : well groomed [General Appearance - In No Acute Distress] : no acute distress [Edema] : no peripheral edema [] : no respiratory distress [Respiration, Rhythm And Depth] : normal respiratory rhythm and effort [Exaggerated Use Of Accessory Muscles For Inspiration] : no accessory muscle use [Abdomen Soft] : soft [Abdomen Tenderness] : non-tender [Costovertebral Angle Tenderness] : no ~M costovertebral angle tenderness [Urethral Meatus] : meatus normal [Penis Abnormality] : normal circumcised penis [Urinary Bladder Findings] : the bladder was normal on palpation [Scrotum] : the scrotum was normal [Rectal Exam - Seminal Vesicles] : the seminal vesicles were normal [Epididymis] : the epididymides were normal [Testes Tenderness] : no tenderness of the testes [Testes Mass (___cm)] : there were no testicular masses [Prostate Enlargement] : the prostate was not enlarged [Prostate Tenderness] : the prostate was not tender [No Prostate Nodules] : no prostate nodules [Prostate Size ___ (0-4)] : prostate size [unfilled] (scale: 0-4) [Normal Station and Gait] : the gait and station were normal for the patient's age [Skin Color & Pigmentation] : normal skin color and pigmentation [No Focal Deficits] : no focal deficits [Oriented To Time, Place, And Person] : oriented to person, place, and time [Affect] : the affect was normal [Mood] : the mood was normal [Not Anxious] : not anxious

## 2022-09-29 NOTE — LETTER BODY
[Dear  ___] : Dear  [unfilled], [Courtesy Letter:] : I had the pleasure of seeing your patient, [unfilled], in my office today. [Please see my note below.] : Please see my note below. [Sincerely,] : Sincerely, [FreeTextEntry3] : Ed\par \par Ed Reyes MD\par [facility]\par  of Urology\par [employer] at [facility]\par

## 2022-09-29 NOTE — HISTORY OF PRESENT ILLNESS
[FreeTextEntry1] : he had some blood work done and his PSA was slightly elevated.  no dysuria or hematuria or urgency. FOS is adequate.  No nocturia.

## 2022-09-29 NOTE — ASSESSMENT
[FreeTextEntry1] : Impression:\par \par elevated PSA, resolved\par \par Plan:\par follow upwith primary for yearly prostate checks. \par \par

## 2022-10-25 ENCOUNTER — RX RENEWAL (OUTPATIENT)
Age: 67
End: 2022-10-25

## 2022-11-07 ENCOUNTER — APPOINTMENT (OUTPATIENT)
Dept: ENDOCRINOLOGY | Facility: CLINIC | Age: 67
End: 2022-11-07

## 2022-11-07 VITALS
HEART RATE: 92 BPM | HEIGHT: 70 IN | DIASTOLIC BLOOD PRESSURE: 88 MMHG | RESPIRATION RATE: 15 BRPM | TEMPERATURE: 97.9 F | SYSTOLIC BLOOD PRESSURE: 144 MMHG | OXYGEN SATURATION: 96 % | BODY MASS INDEX: 25.91 KG/M2 | WEIGHT: 181 LBS

## 2022-11-07 PROCEDURE — 99214 OFFICE O/P EST MOD 30 MIN: CPT

## 2022-11-11 NOTE — ED PROVIDER NOTE - PATIENT PORTAL LINK FT
Statement Selected You can access the FollowMyHealth Patient Portal offered by [facility] by registering at the following website: [email or web address]. By joining [facility]’s FollowMyHealth portal, you will also be able to view your health information using other applications (apps) compatible with our system.

## 2023-01-10 ENCOUNTER — NON-APPOINTMENT (OUTPATIENT)
Age: 68
End: 2023-01-10

## 2023-01-10 LAB
ANION GAP SERPL CALC-SCNC: 12 MMOL/L
BUN SERPL-MCNC: 23 MG/DL
CALCIUM SERPL-MCNC: 10 MG/DL
CHLORIDE SERPL-SCNC: 105 MMOL/L
CHOLEST SERPL-MCNC: 129 MG/DL
CO2 SERPL-SCNC: 26 MMOL/L
CREAT SERPL-MCNC: 0.82 MG/DL
CREAT SPEC-SCNC: 75 MG/DL
EGFR: 95 ML/MIN/1.73M2
ESTIMATED AVERAGE GLUCOSE: 137 MG/DL
GLUCOSE SERPL-MCNC: 144 MG/DL
HBA1C MFR BLD HPLC: 6.4 %
HDLC SERPL-MCNC: 44 MG/DL
LDLC SERPL CALC-MCNC: 65 MG/DL
MICROALBUMIN 24H UR DL<=1MG/L-MCNC: 68.7 MG/DL
MICROALBUMIN/CREAT 24H UR-RTO: 921 MG/G
NONHDLC SERPL-MCNC: 85 MG/DL
POTASSIUM SERPL-SCNC: 4.6 MMOL/L
SODIUM SERPL-SCNC: 143 MMOL/L
TRIGL SERPL-MCNC: 100 MG/DL

## 2023-02-05 ENCOUNTER — RX RENEWAL (OUTPATIENT)
Age: 68
End: 2023-02-05

## 2023-02-27 ENCOUNTER — OUTPATIENT (OUTPATIENT)
Dept: OUTPATIENT SERVICES | Facility: HOSPITAL | Age: 68
LOS: 1 days | End: 2023-02-27

## 2023-02-27 ENCOUNTER — APPOINTMENT (OUTPATIENT)
Dept: MRI IMAGING | Facility: CLINIC | Age: 68
End: 2023-02-27
Payer: MEDICARE

## 2023-02-27 DIAGNOSIS — Z00.00 ENCOUNTER FOR GENERAL ADULT MEDICAL EXAMINATION WITHOUT ABNORMAL FINDINGS: ICD-10-CM

## 2023-02-27 PROCEDURE — 72195 MRI PELVIS W/O DYE: CPT | Mod: 26,MH

## 2023-02-27 PROCEDURE — 72148 MRI LUMBAR SPINE W/O DYE: CPT | Mod: 26,MH

## 2023-03-02 ENCOUNTER — APPOINTMENT (OUTPATIENT)
Dept: INTERNAL MEDICINE | Facility: CLINIC | Age: 68
End: 2023-03-02
Payer: MEDICARE

## 2023-03-02 ENCOUNTER — NON-APPOINTMENT (OUTPATIENT)
Age: 68
End: 2023-03-02

## 2023-03-02 VITALS
TEMPERATURE: 97.2 F | HEIGHT: 70 IN | BODY MASS INDEX: 25.77 KG/M2 | WEIGHT: 180 LBS | SYSTOLIC BLOOD PRESSURE: 134 MMHG | OXYGEN SATURATION: 98 % | RESPIRATION RATE: 15 BRPM | DIASTOLIC BLOOD PRESSURE: 80 MMHG | HEART RATE: 89 BPM

## 2023-03-02 DIAGNOSIS — Z13.6 ENCOUNTER FOR SCREENING FOR CARDIOVASCULAR DISORDERS: ICD-10-CM

## 2023-03-02 DIAGNOSIS — Z23 ENCOUNTER FOR IMMUNIZATION: ICD-10-CM

## 2023-03-02 DIAGNOSIS — E66.3 OVERWEIGHT: ICD-10-CM

## 2023-03-02 PROCEDURE — 93000 ELECTROCARDIOGRAM COMPLETE: CPT

## 2023-03-02 PROCEDURE — G0439: CPT

## 2023-03-02 NOTE — ASSESSMENT
[FreeTextEntry1] : -PMH: T2DM, HLD, GERD w/ Manzano's, Generalized OA (Pre-dominantly b/l Knee's), DDD (Mod R. L4-L5 Foraminal Narrowing)\par -SH: Significant other. No children. Current smoker. Occasional EtOH use. \par \par MIRYAM is a 67 year M whom is here today for an annual well check \par \par Specialists Involved:\par -Pain Mgt: Dr. Medina \par -GI: Dr. Miguel Bedoya \par -Endo: Dr. Mary Sosa\par -Uro: Dr. Ed Reyes\par -Cardio: Consult pending\par \par EKG obtained in office today demonstrates Sinus Tachycardia. normal axis/Intervals. Poor-R-wave progression. Normal EKG. \par \par -F/u w/ Optho (DM)\par -Still needs to consult w/ Cardio (Sinus Tachycardia, DM, Smoker)\par -Still needs to obtain Lung CA CT Screening\par -F/u w/ GI (Routine Colon CA Screening & Manzano's)\par -Continue f/u w/ Endo (DM)\par -RTO 6mo for routine f/u & labs or sooner if needed

## 2023-03-02 NOTE — PHYSICAL EXAM
[No Acute Distress] : no acute distress [Well Nourished] : well nourished [Well Developed] : well developed [Well-Appearing] : well-appearing [Normal Sclera/Conjunctiva] : normal sclera/conjunctiva [PERRL] : pupils equal round and reactive to light [EOMI] : extraocular movements intact [Normal Outer Ear/Nose] : the outer ears and nose were normal in appearance [Normal Oropharynx] : the oropharynx was normal [No JVD] : no jugular venous distention [No Lymphadenopathy] : no lymphadenopathy [Supple] : supple [Thyroid Normal, No Nodules] : the thyroid was normal and there were no nodules present [No Respiratory Distress] : no respiratory distress  [No Accessory Muscle Use] : no accessory muscle use [Clear to Auscultation] : lungs were clear to auscultation bilaterally [Normal Rate] : normal rate  [Regular Rhythm] : with a regular rhythm [Normal S1, S2] : normal S1 and S2 [No Murmur] : no murmur heard [No Carotid Bruits] : no carotid bruits [No Abdominal Bruit] : a ~M bruit was not heard ~T in the abdomen [No Varicosities] : no varicosities [Pedal Pulses Present] : the pedal pulses are present [No Edema] : there was no peripheral edema [No Palpable Aorta] : no palpable aorta [No Extremity Clubbing/Cyanosis] : no extremity clubbing/cyanosis [Soft] : abdomen soft [Non Tender] : non-tender [Non-distended] : non-distended [No Masses] : no abdominal mass palpated [No HSM] : no HSM [Normal Bowel Sounds] : normal bowel sounds [No CVA Tenderness] : no CVA  tenderness [No Spinal Tenderness] : no spinal tenderness [No Joint Swelling] : no joint swelling [Grossly Normal Strength/Tone] : grossly normal strength/tone [No Rash] : no rash [Coordination Grossly Intact] : coordination grossly intact [No Focal Deficits] : no focal deficits [Normal Gait] : normal gait [Deep Tendon Reflexes (DTR)] : deep tendon reflexes were 2+ and symmetric [Normal Affect] : the affect was normal [Normal Insight/Judgement] : insight and judgment were intact [None] : no ulcers in either foot were found [] : both feet [TWNoteComboBox3] : +2

## 2023-03-02 NOTE — HEALTH RISK ASSESSMENT
[Very Good] : ~his/her~  mood as very good [Yes] : Yes [Monthly or less (1 pt)] : Monthly or less (1 point) [1 or 2 (0 pts)] : 1 or 2 (0 points) [Never (0 pts)] : Never (0 points) [No] : In the past 12 months have you used drugs other than those required for medical reasons? No [No falls in past year] : Patient reported no falls in the past year [0] : 2) Feeling down, depressed, or hopeless: Not at all (0) [PHQ-2 Negative - No further assessment needed] : PHQ-2 Negative - No further assessment needed [HIV test declined] : HIV test declined [Hepatitis C test declined] : Hepatitis C test declined [None] : None [With Significant Other] : lives with significant other [Retired] : retired [Significant Other] : lives with significant other [# Of Children ___] : has [unfilled] children [Fully functional (bathing, dressing, toileting, transferring, walking, feeding)] : Fully functional (bathing, dressing, toileting, transferring, walking, feeding) [Fully functional (using the telephone, shopping, preparing meals, housekeeping, doing laundry, using] : Fully functional and needs no help or supervision to perform IADLs (using the telephone, shopping, preparing meals, housekeeping, doing laundry, using transportation, managing medications and managing finances) [Reviewed no changes] : Reviewed, no changes [Current] : Current [20 or more] : 20 or more [Patient declined Low Dose CT Scan] : Patient declined Low Dose CT Scan [Patient declined Retinal Exam] : Patient declined Retinal Exam. [Patient declined colonoscopy] : Patient declined colonoscopy [Audit-CScore] : 1 [[facility]] : 10 [[id number]] : 0 [LowDoseCTScan] : 03/23 [EyeExamDate] : 03/23 [Change in mental status noted] : No change in mental status noted [Reports changes in hearing] : Reports no changes in hearing [Reports changes in vision] : Reports no changes in vision [ColonoscopyDate] : 03/23 [ColonoscopyComments] : GI referral given today [AdvancecareDate] : 03/23

## 2023-04-20 ENCOUNTER — APPOINTMENT (OUTPATIENT)
Dept: ENDOCRINOLOGY | Facility: CLINIC | Age: 68
End: 2023-04-20

## 2023-04-23 ENCOUNTER — RX RENEWAL (OUTPATIENT)
Age: 68
End: 2023-04-23

## 2023-05-24 ENCOUNTER — APPOINTMENT (OUTPATIENT)
Dept: ENDOCRINOLOGY | Facility: CLINIC | Age: 68
End: 2023-05-24
Payer: MEDICARE

## 2023-05-24 ENCOUNTER — RESULT CHARGE (OUTPATIENT)
Age: 68
End: 2023-05-24

## 2023-05-24 VITALS
SYSTOLIC BLOOD PRESSURE: 124 MMHG | HEART RATE: 76 BPM | HEIGHT: 70 IN | WEIGHT: 184 LBS | DIASTOLIC BLOOD PRESSURE: 72 MMHG | BODY MASS INDEX: 26.34 KG/M2

## 2023-05-24 LAB — GLUCOSE BLDC GLUCOMTR-MCNC: 146

## 2023-05-24 PROCEDURE — 82962 GLUCOSE BLOOD TEST: CPT

## 2023-05-24 PROCEDURE — 99214 OFFICE O/P EST MOD 30 MIN: CPT | Mod: 25

## 2023-05-24 NOTE — REVIEW OF SYSTEMS
[Fatigue] : no fatigue [Decreased Appetite] : appetite not decreased [Recent Weight Gain (___ Lbs)] : no recent weight gain [Recent Weight Loss (___ Lbs)] : no recent weight loss [Visual Field Defect] : no visual field defect [Blurred Vision] : no blurred vision [Dysphagia] : no dysphagia [Neck Pain] : no neck pain [Dysphonia] : no dysphonia [Chest Pain] : no chest pain [Palpitations] : no palpitations [Constipation] : no constipation [Diarrhea] : no diarrhea [Headaches] : no headaches [Tremors] : no tremors [Depression] : no depression [Anxiety] : no anxiety [Polydipsia] : no polydipsia [Swelling] : no swelling [FreeTextEntry2] : weight stable

## 2023-05-24 NOTE — ASSESSMENT
[FreeTextEntry1] : Devaughn is a 67 yr old male, who presents today for follow up in regards type 2 diabetes.  \par Was seen by Dr. Warren in 4/22.\par Per patient , has been diabetic  since 12/21, his a1c was 12.2% then, but he think he was undiagnosed for some time.\par He had bad sciatica pain, was given steroid injections for a few months before he was diagnosed.\par \par He says he was 270 pounds 8 years ago, he started working on it and now he is 1706 pounds.He changed his diet and he is happy about it.\par \par Currently taking metformin 1000 mg bid, jardiance 10 mg daily\par \par A1C  6.4% in 1/23\par No recent blood work, will get blood work, continue checking blood sugars\par \par Medication changes:\par To continue same meds for now\par Continue to check blood sugars\par Diet and exercise reviewed.\par Hypoglycemia reviewed.\par  Eyes: no  active issues,  to check for retinopathy, stressed the importance\par Feet: no active issues, no neuropathy.  Diabetic foot care reviewed.\par  Lipids:  on statin/ anti-lipid treatment. Last LDL: 65\par  Renal/ B/P : B/P wnl , on ACE/ ARB.will check for  proteinuria with labs\par Weight:  Overweight.lost 100 pounds in last few years. weight is now stable.\par  Balanced diet and exercise reviewed.\par Follow up with [facility] due to spinal mass\par \par  Diabetes counselling: \par The patient was counseled on diabetes foot care, long term vascular complications of diabetes, carbohydrate consistent diet, importance of diet and exercise to improve glycemic control, achieve weight loss and improve cardiovascular health and action and use of short and long-acting insulin. Patient was referred to ophthalmology for retinopathy screening. ADA diet (30-50 gm carbohydrates with each meal, 15 grams with snacks. Balance with lean proteins and low fat diet.) Exercise daily per ability, work up to 30 minutes a day. Medication, risks and benefits reviewed. Glucose testing and insulin administration for glycemic management.\par \par \par RTO in 3 months with Dr. Sosa\par

## 2023-05-24 NOTE — PHYSICAL EXAM
[Alert] : alert [Well Nourished] : well nourished [No Acute Distress] : no acute distress [Well Developed] : well developed [Normal Sclera/Conjunctiva] : normal sclera/conjunctiva [No Proptosis] : no proptosis [No LAD] : no lymphadenopathy [Thyroid Not Enlarged] : the thyroid was not enlarged [No Thyroid Nodules] : no palpable thyroid nodules [No Respiratory Distress] : no respiratory distress [No Accessory Muscle Use] : no accessory muscle use [Normal Rate and Effort] : normal respiratory rate and effort [Clear to Auscultation] : lungs were clear to auscultation bilaterally [Normal S1, S2] : normal S1 and S2 [Normal Rate] : heart rate was normal [Regular Rhythm] : with a regular rhythm [Normal Bowel Sounds] : normal bowel sounds [Not Tender] : non-tender [Soft] : abdomen soft [No Rash] : no rash [No Tremors] : no tremors [Oriented x3] : oriented to person, place, and time [Normal Affect] : the affect was normal [Normal Insight/Judgement] : insight and judgment were intact [Normal Mood] : the mood was normal [Acanthosis Nigricans] : no acanthosis nigricans

## 2023-05-24 NOTE — HISTORY OF PRESENT ILLNESS
[FreeTextEntry1] : Devaughn is a 67 yr old male, who presents today for follow up in regards type 2  diabetes.  \par Was seen by Dr. Warren in 4/22.\par Per patient , has been diabetic  since 12/21, his a1c was 12.2% then, but he think he was undiagnosed for some time.\par \par \par He had bad sciatica pain, was given steroid injections for a few months before he was diagnosed. Still receiving injections. \par He has been following [facility] due to mass in lower spine, next appointment in August, unsure if going to have surgery. Patient wants to go for a second opinion. Patient was told the mass is noncancerous. \par \par He says he was 270 pounds 8 years ago, he started working on it and now he is 1706 pounds.He changed his diet and he is happy about it.\par \par Currently taking metformin 1000 mg bid, jardiance 10 mg daily\par \par A1C  6.4% in 1/23\par \par Home Glucose Monitoring\par Frequency: once a day\par Current  previous per patient 124 before breakfast\par BG Documentation:  \par BG Readings -only in mornings,  109-120s\par Diet is good,  mostly eats healthy, lost a lot of weight.  \par \par \par Diabetic History\par ER Visits:  none\par Hospitalizations:   none\par Diet Therapy: watching his diet\par Diabetic Education:   no\par Exercise: very active for his age\par Last eye exam: not yet, still needs to go

## 2023-06-30 ENCOUNTER — APPOINTMENT (OUTPATIENT)
Dept: GASTROENTEROLOGY | Facility: CLINIC | Age: 68
End: 2023-06-30
Payer: MEDICARE

## 2023-06-30 VITALS
DIASTOLIC BLOOD PRESSURE: 74 MMHG | TEMPERATURE: 97.88 F | WEIGHT: 190 LBS | BODY MASS INDEX: 27.2 KG/M2 | RESPIRATION RATE: 16 BRPM | HEART RATE: 79 BPM | HEIGHT: 70 IN | OXYGEN SATURATION: 97 % | SYSTOLIC BLOOD PRESSURE: 140 MMHG

## 2023-06-30 DIAGNOSIS — K22.70 BARRETT'S ESOPHAGUS W/OUT DYSPLASIA: ICD-10-CM

## 2023-06-30 DIAGNOSIS — K21.00 DIAPHRAGMATIC HERNIA W/OUT OBSTRUCTION OR GANGRENE: ICD-10-CM

## 2023-06-30 DIAGNOSIS — K44.9 DIAPHRAGMATIC HERNIA W/OUT OBSTRUCTION OR GANGRENE: ICD-10-CM

## 2023-06-30 DIAGNOSIS — Z12.9 ENCOUNTER FOR SCREENING FOR MALIGNANT NEOPLASM, SITE UNSPECIFIED: ICD-10-CM

## 2023-06-30 PROCEDURE — 99214 OFFICE O/P EST MOD 30 MIN: CPT

## 2023-06-30 NOTE — HISTORY OF PRESENT ILLNESS
[FreeTextEntry1] : MIRYAM FINCH is a 68 year old male with PMH of HTN, HLD, DM, and Manzano's esophagus presenting today for colon cancer screening. Last colonoscopy was 7-10 years ago and he believes it was normal. Family history is negative for colon cancer and colon polyps. 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 He was sent to an orthopedic oncologist in [place] who did not believe it was malignant but wanted a colonoscopy done to rule out anal invasion. He denies any abdominal or rectal pain but does state that he "feels like I'm sitting on a rock". No constipation, diarrhea, black or bloody stools. He's having regular bowel movements. \par \par He takes Omeprazole 40 mg BID and Sucralfate 1 G TID for chronic GERD and Manzano's esophagus. Denies heartburn, epigastric pain, nausea, vomiting. dysphagia, odynophagia, weight loss, loss of appetite. Last EGD was in February of this year. [de-identified] : 2023

## 2023-06-30 NOTE — ASSESSMENT
[FreeTextEntry1] : 68 year old male with recent discovery of a right gluteal lipoma with questionable infiltration into the anus. MRI reviewed in PACS. Large lipomatous mass deep in right gluteus emerson. No mention of anal involvement but orthopedic oncologist is concerned and would like endoscopic evaluation. Pt declined examination of the area. He will be scheduled for a colonoscopy with GaviLyte prep. I have discussed the indications (including but not limited to ruling out inflammatory bowel disease, colorectal neoplasm, GI bleed, and AVM's), benefits, risks  (including but not limited to reaction to the anesthesia, infection, bleeding, missed lesions, and perforation),  and alternatives to colonoscopy with the patient. The patient understands all options and has agreed to have a colonoscopy and is medically optimized for the planned procedure. \par \par EGD in 2026 for Manzano's surveillance

## 2023-06-30 NOTE — PHYSICAL EXAM

## 2023-07-12 ENCOUNTER — RX RENEWAL (OUTPATIENT)
Age: 68
End: 2023-07-12

## 2023-08-14 ENCOUNTER — OUTPATIENT (OUTPATIENT)
Dept: OUTPATIENT SERVICES | Facility: HOSPITAL | Age: 68
LOS: 1 days | End: 2023-08-14
Payer: MEDICARE

## 2023-08-14 ENCOUNTER — APPOINTMENT (OUTPATIENT)
Dept: MRI IMAGING | Facility: CLINIC | Age: 68
End: 2023-08-14
Payer: MEDICARE

## 2023-08-14 DIAGNOSIS — Z00.8 ENCOUNTER FOR OTHER GENERAL EXAMINATION: ICD-10-CM

## 2023-08-14 PROCEDURE — 72195 MRI PELVIS W/O DYE: CPT | Mod: MH

## 2023-08-14 PROCEDURE — 72195 MRI PELVIS W/O DYE: CPT | Mod: 26,MH

## 2023-08-24 ENCOUNTER — RX RENEWAL (OUTPATIENT)
Age: 68
End: 2023-08-24

## 2023-09-05 ENCOUNTER — TRANSCRIPTION ENCOUNTER (OUTPATIENT)
Age: 68
End: 2023-09-05

## 2023-09-05 ENCOUNTER — APPOINTMENT (OUTPATIENT)
Dept: INTERNAL MEDICINE | Facility: CLINIC | Age: 68
End: 2023-09-05
Payer: MEDICARE

## 2023-09-05 VITALS
HEIGHT: 70 IN | BODY MASS INDEX: 27.06 KG/M2 | RESPIRATION RATE: 16 BRPM | TEMPERATURE: 207.14 F | HEART RATE: 89 BPM | WEIGHT: 189 LBS | OXYGEN SATURATION: 95 % | SYSTOLIC BLOOD PRESSURE: 140 MMHG | DIASTOLIC BLOOD PRESSURE: 80 MMHG

## 2023-09-05 DIAGNOSIS — E78.5 HYPERLIPIDEMIA, UNSPECIFIED: ICD-10-CM

## 2023-09-05 PROCEDURE — 36415 COLL VENOUS BLD VENIPUNCTURE: CPT

## 2023-09-05 PROCEDURE — 99214 OFFICE O/P EST MOD 30 MIN: CPT | Mod: 25

## 2023-09-06 LAB
ALBUMIN SERPL ELPH-MCNC: 4.7 G/DL
ALP BLD-CCNC: 98 U/L
ALT SERPL-CCNC: 13 U/L
ANION GAP SERPL CALC-SCNC: 14 MMOL/L
AST SERPL-CCNC: 10 U/L
BILIRUB SERPL-MCNC: 1.1 MG/DL
BUN SERPL-MCNC: 25 MG/DL
CALCIUM SERPL-MCNC: 9.8 MG/DL
CHLORIDE SERPL-SCNC: 106 MMOL/L
CHOLEST SERPL-MCNC: 132 MG/DL
CO2 SERPL-SCNC: 22 MMOL/L
CREAT SERPL-MCNC: 0.73 MG/DL
CREAT SPEC-SCNC: 154 MG/DL
EGFR: 98 ML/MIN/1.73M2
ESTIMATED AVERAGE GLUCOSE: 154 MG/DL
GLUCOSE SERPL-MCNC: 156 MG/DL
HBA1C MFR BLD HPLC: 7 %
HCT VFR BLD CALC: 52.8 %
HDLC SERPL-MCNC: 43 MG/DL
HGB BLD-MCNC: 17.4 G/DL
LDLC SERPL CALC-MCNC: 67 MG/DL
MCHC RBC-ENTMCNC: 31.1 PG
MCHC RBC-ENTMCNC: 33 GM/DL
MCV RBC AUTO: 94.5 FL
MICROALBUMIN 24H UR DL<=1MG/L-MCNC: 91.7 MG/DL
MICROALBUMIN/CREAT 24H UR-RTO: 597 MG/G
NONHDLC SERPL-MCNC: 89 MG/DL
PLATELET # BLD AUTO: 204 K/UL
POTASSIUM SERPL-SCNC: 4.1 MMOL/L
PROT SERPL-MCNC: 6.9 G/DL
RBC # BLD: 5.59 M/UL
RBC # FLD: 12.7 %
SODIUM SERPL-SCNC: 142 MMOL/L
TRIGL SERPL-MCNC: 124 MG/DL
VIT B12 SERPL-MCNC: 343 PG/ML
WBC # FLD AUTO: 8.6 K/UL

## 2023-09-06 NOTE — ASSESSMENT
[FreeTextEntry1] : -PMH: T2DM, HLD, GERD w/ Manzano's, Generalized OA (Pre-dominantly b/l Knee's), DDD (Mod R. L4-L5 Foraminal Narrowing) -SH: Significant other. No children. Current smoker. Occasional EtOH use.   MIRYAM is a 68 year M whom is here today for f/u  Specialists Involved: -Pain Mgt: Dr. Medina  -GI: Dr. Miguel Bedoya  -Endo: Dr. Mary Sosa -Uro: Dr. Ed Reyes -Cardio: Consult pending   -F/u w/ Optho (DM) -Still needs to consult w/ Cardio (Sinus Tachycardia, DM, Smoker) -Still needs to obtain Lung CA CT Screening -F/u w/ GI (Routine Colon CA Screening & Manzano's) -Continue f/u w/ Endo (DM) -RTO 6mo for CPE or sooner if needed

## 2023-09-06 NOTE — HISTORY OF PRESENT ILLNESS
[FreeTextEntry1] : DM, HLD, GERD, recent oncology consult [de-identified] : -PMH: T2DM, HLD, GERD w/ Manzano's, Generalized OA (Pre-dominantly b/l Knee's), DDD (Mod R. L4-L5 Foraminal Narrowing) -SH: Significant other. No children. Current smoker. Occasional EtOH use.   MIRYAM is a 68 year M whom is here today for f/u DM, HLD, GERD today, pt reports that he is feeling well has recently been following w/ onc for a lesion on his sciatic nerve and one located near his anus.  has a colonoscopy scheduled in 10 days from now to better evaluate  -T2DM: Remains on Metformin 1000mg BID & Jardiance 10mg QD. On ASA & Statin. Compliant w/ BG monitoring. On ASA & Statin. (1/2023) A1c 6.4. Still Needs to consult w/ Optho. Follows w/ Endo.  -HLD: Remains on Crestor 10mg HS.   -GERD w/ Manzano's: Remains on Omeprazole 40mg BID & Carafate. (2/2022) EGD: Gastritis & Manzano's.  -Generalized OA (Pre-dominantly b/l Knee's), DDD (Mod R. L4-L5 Foraminal Narrowing): S/p Epidural Injection. Following w/ Pain Mgt.  -Long Standing Smoking Hx: On and off over the last 40yrs. Currently quit 1mo ago.

## 2023-09-06 NOTE — ADDENDUM
[FreeTextEntry1] : A1c 7.0 increased from prior value of 6.4. Is patient still following with endocrinology?  If so I would recommend he follow-up with them for additional recommendations on management of his diabetes  Persistent macroscopic proteinuria likely in the setting of history of uncontrolled diabetes Continue on current medications Recommend nephrology consultation

## 2023-09-13 ENCOUNTER — NON-APPOINTMENT (OUTPATIENT)
Age: 68
End: 2023-09-13

## 2023-09-15 ENCOUNTER — APPOINTMENT (OUTPATIENT)
Dept: ENDOCRINOLOGY | Facility: CLINIC | Age: 68
End: 2023-09-15

## 2023-09-15 RX ORDER — POLYETHYLENE GLYCOL-3350 AND ELECTROLYTES WITH FLAVOR PACK 240; 5.84; 2.98; 6.72; 22.72 G/278.26G; G/278.26G; G/278.26G; G/278.26G; G/278.26G
240 POWDER, FOR SOLUTION ORAL
Qty: 1 | Refills: 0 | Status: ACTIVE | COMMUNITY
Start: 2023-09-15 | End: 1900-01-01

## 2023-09-17 ENCOUNTER — TRANSCRIPTION ENCOUNTER (OUTPATIENT)
Age: 68
End: 2023-09-17

## 2023-09-18 ENCOUNTER — RESULT REVIEW (OUTPATIENT)
Age: 68
End: 2023-09-18

## 2023-09-18 ENCOUNTER — APPOINTMENT (OUTPATIENT)
Dept: GASTROENTEROLOGY | Facility: GI CENTER | Age: 68
End: 2023-09-18
Payer: MEDICARE

## 2023-09-18 ENCOUNTER — OUTPATIENT (OUTPATIENT)
Dept: OUTPATIENT SERVICES | Facility: HOSPITAL | Age: 68
LOS: 1 days | End: 2023-09-18
Payer: MEDICARE

## 2023-09-18 DIAGNOSIS — Z12.11 ENCOUNTER FOR SCREENING FOR MALIGNANT NEOPLASM OF COLON: ICD-10-CM

## 2023-09-18 DIAGNOSIS — D12.4 BENIGN NEOPLASM OF DESCENDING COLON: ICD-10-CM

## 2023-09-18 DIAGNOSIS — K57.30 DIVERTICULOSIS OF LARGE INTESTINE W/OUT PERFORATION OR ABSCESS W/OUT BLEEDING: ICD-10-CM

## 2023-09-18 LAB — GLUCOSE BLDC GLUCOMTR-MCNC: 132 MG/DL — HIGH (ref 70–99)

## 2023-09-18 PROCEDURE — 45380 COLONOSCOPY AND BIOPSY: CPT | Mod: PT

## 2023-09-18 PROCEDURE — 88305 TISSUE EXAM BY PATHOLOGIST: CPT | Mod: 26

## 2023-09-18 PROCEDURE — 45385 COLONOSCOPY W/LESION REMOVAL: CPT | Mod: PT

## 2023-09-18 PROCEDURE — 88305 TISSUE EXAM BY PATHOLOGIST: CPT

## 2023-09-18 PROCEDURE — 82962 GLUCOSE BLOOD TEST: CPT

## 2023-09-20 LAB — SURGICAL PATHOLOGY STUDY: SIGNIFICANT CHANGE UP

## 2023-10-26 ENCOUNTER — RX RENEWAL (OUTPATIENT)
Age: 68
End: 2023-10-26

## 2023-10-26 RX ORDER — OMEPRAZOLE 40 MG/1
40 CAPSULE, DELAYED RELEASE ORAL
Qty: 180 | Refills: 1 | Status: ACTIVE | COMMUNITY
Start: 2021-12-28 | End: 1900-01-01

## 2023-11-26 ENCOUNTER — RX RENEWAL (OUTPATIENT)
Age: 68
End: 2023-11-26

## 2023-11-27 ENCOUNTER — RX RENEWAL (OUTPATIENT)
Age: 68
End: 2023-11-27

## 2023-12-01 ENCOUNTER — APPOINTMENT (OUTPATIENT)
Dept: ENDOCRINOLOGY | Facility: CLINIC | Age: 68
End: 2023-12-01
Payer: MEDICARE

## 2023-12-01 VITALS
BODY MASS INDEX: 27.35 KG/M2 | WEIGHT: 191 LBS | HEIGHT: 70 IN | DIASTOLIC BLOOD PRESSURE: 84 MMHG | SYSTOLIC BLOOD PRESSURE: 134 MMHG

## 2023-12-01 VITALS — OXYGEN SATURATION: 99 % | HEART RATE: 55 BPM

## 2023-12-01 LAB — GLUCOSE BLDC GLUCOMTR-MCNC: 134

## 2023-12-01 PROCEDURE — 82962 GLUCOSE BLOOD TEST: CPT

## 2023-12-01 PROCEDURE — 99215 OFFICE O/P EST HI 40 MIN: CPT | Mod: 25

## 2024-01-12 ENCOUNTER — APPOINTMENT (OUTPATIENT)
Dept: COLORECTAL SURGERY | Facility: CLINIC | Age: 69
End: 2024-01-12
Payer: MEDICARE

## 2024-01-12 VITALS
TEMPERATURE: 208.4 F | BODY MASS INDEX: 27.35 KG/M2 | DIASTOLIC BLOOD PRESSURE: 97 MMHG | SYSTOLIC BLOOD PRESSURE: 159 MMHG | RESPIRATION RATE: 16 BRPM | WEIGHT: 191 LBS | HEART RATE: 74 BPM | HEIGHT: 70 IN

## 2024-01-12 DIAGNOSIS — D17.1 BENIGN LIPOMATOUS NEOPLASM OF SKIN AND SUBCUTANEOUS TISSUE OF TRUNK: ICD-10-CM

## 2024-01-12 PROCEDURE — 99203 OFFICE O/P NEW LOW 30 MIN: CPT

## 2024-01-12 NOTE — HISTORY OF PRESENT ILLNESS
[FreeTextEntry1] : 68-year-old male who presents for consultation for a lipomatous mass noted in his right gluteus.  It was noted on imaging.  He reports pain with sitting and certain positions.  His colonoscopy is up-to-date and he had tubular adenoma removed from descending colon.  He has no GI symptoms.

## 2024-01-12 NOTE — PHYSICAL EXAM
[Respiratory Effort] : normal respiratory effort [Normal Rate and Rhythm] : normal rate and rhythm [Calm] : calm [de-identified] : Soft, nontender, nondistended.  No mass or hernias appreciated. [de-identified] : Slight fullness in the right gluteus although the mass is not discretely palpable [de-identified] : Well-appearing, in no distress [de-identified] : Normocephalic, atraumatic [de-identified] : Warm and dry [de-identified] : Alert and oriented x 3

## 2024-01-12 NOTE — PLAN
[TextEntry] : 68-year-old male with a lipoma lipomatous mass of the right gluteal area with close proximity to the neurovascular bundle.  He has already seen an orthopedic surgeon for this and has an upcoming appointment with [facility] for special surgery.  I think based on the location, he is better served by that specialty.

## 2024-01-12 NOTE — CONSULT LETTER
[Dear  ___] : Dear  [unfilled], [Consult Letter:] : I had the pleasure of evaluating your patient, [unfilled]. [Please see my note below.] : Please see my note below. [Consult Closing:] : Thank you very much for allowing me to participate in the care of this patient.  If you have any questions, please do not hesitate to contact me. [Sincerely,] : Sincerely, [FreeTextEntry3] : Jayjay Traore MD

## 2024-01-12 NOTE — DATA REVIEWED
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

## 2024-02-02 ENCOUNTER — OUTPATIENT (OUTPATIENT)
Dept: OUTPATIENT SERVICES | Facility: HOSPITAL | Age: 69
LOS: 1 days | End: 2024-02-02
Payer: MEDICARE

## 2024-02-02 ENCOUNTER — APPOINTMENT (OUTPATIENT)
Dept: MRI IMAGING | Facility: CLINIC | Age: 69
End: 2024-02-02
Payer: MEDICARE

## 2024-02-02 DIAGNOSIS — Z00.8 ENCOUNTER FOR OTHER GENERAL EXAMINATION: ICD-10-CM

## 2024-02-02 PROCEDURE — 72195 MRI PELVIS W/O DYE: CPT | Mod: MH

## 2024-02-02 PROCEDURE — 72195 MRI PELVIS W/O DYE: CPT | Mod: 26,MH

## 2024-02-19 ENCOUNTER — OUTPATIENT (OUTPATIENT)
Dept: OUTPATIENT SERVICES | Facility: HOSPITAL | Age: 69
LOS: 1 days | End: 2024-02-19
Payer: MEDICARE

## 2024-02-19 ENCOUNTER — APPOINTMENT (OUTPATIENT)
Dept: MRI IMAGING | Facility: CLINIC | Age: 69
End: 2024-02-19
Payer: MEDICARE

## 2024-02-19 DIAGNOSIS — Z00.8 ENCOUNTER FOR OTHER GENERAL EXAMINATION: ICD-10-CM

## 2024-02-19 PROCEDURE — 72148 MRI LUMBAR SPINE W/O DYE: CPT | Mod: MH

## 2024-02-19 PROCEDURE — 72148 MRI LUMBAR SPINE W/O DYE: CPT | Mod: 26,MH

## 2024-02-25 DIAGNOSIS — Z13.29 ENCOUNTER FOR SCREENING FOR OTHER SUSPECTED ENDOCRINE DISORDER: ICD-10-CM

## 2024-02-27 LAB
ALBUMIN SERPL ELPH-MCNC: 4.5 G/DL
ALP BLD-CCNC: 94 U/L
ALT SERPL-CCNC: 14 U/L
ANION GAP SERPL CALC-SCNC: 12 MMOL/L
AST SERPL-CCNC: 21 U/L
BASOPHILS # BLD AUTO: 0.15 K/UL
BASOPHILS NFR BLD AUTO: 1.7 %
BILIRUB SERPL-MCNC: 0.6 MG/DL
BUN SERPL-MCNC: 31 MG/DL
CALCIUM SERPL-MCNC: 9.9 MG/DL
CHLORIDE SERPL-SCNC: 104 MMOL/L
CHOLEST SERPL-MCNC: 151 MG/DL
CO2 SERPL-SCNC: 25 MMOL/L
CREAT SERPL-MCNC: 0.86 MG/DL
CREAT SPEC-SCNC: 123 MG/DL
EGFR: 93 ML/MIN/1.73M2
EOSINOPHIL # BLD AUTO: 0.55 K/UL
EOSINOPHIL NFR BLD AUTO: 6.1 %
GLUCOSE SERPL-MCNC: 127 MG/DL
HCT VFR BLD CALC: 51.9 %
HDLC SERPL-MCNC: 44 MG/DL
HGB BLD-MCNC: 17.2 G/DL
IMM GRANULOCYTES NFR BLD AUTO: 0.3 %
LDLC SERPL CALC-MCNC: 84 MG/DL
LYMPHOCYTES # BLD AUTO: 2.29 K/UL
LYMPHOCYTES NFR BLD AUTO: 25.5 %
MAN DIFF?: NORMAL
MCHC RBC-ENTMCNC: 30.3 PG
MCHC RBC-ENTMCNC: 33.1 GM/DL
MCV RBC AUTO: 91.5 FL
MICROALBUMIN 24H UR DL<=1MG/L-MCNC: 70.9 MG/DL
MICROALBUMIN/CREAT 24H UR-RTO: 579 MG/G
MONOCYTES # BLD AUTO: 0.75 K/UL
MONOCYTES NFR BLD AUTO: 8.4 %
NEUTROPHILS # BLD AUTO: 5.21 K/UL
NEUTROPHILS NFR BLD AUTO: 58 %
NONHDLC SERPL-MCNC: 107 MG/DL
PLATELET # BLD AUTO: 239 K/UL
POTASSIUM SERPL-SCNC: 4.9 MMOL/L
PROT SERPL-MCNC: 7.1 G/DL
PSA SERPL-MCNC: 5.39 NG/ML
RBC # BLD: 5.67 M/UL
RBC # FLD: 13.1 %
SODIUM SERPL-SCNC: 141 MMOL/L
TRIGL SERPL-MCNC: 134 MG/DL
TSH SERPL-ACNC: 1.81 UIU/ML
VIT B12 SERPL-MCNC: 381 PG/ML
WBC # FLD AUTO: 8.98 K/UL

## 2024-02-28 LAB
ESTIMATED AVERAGE GLUCOSE: 140 MG/DL
HBA1C MFR BLD HPLC: 6.5 %

## 2024-03-06 ENCOUNTER — APPOINTMENT (OUTPATIENT)
Dept: INTERNAL MEDICINE | Facility: CLINIC | Age: 69
End: 2024-03-06
Payer: MEDICARE

## 2024-03-06 ENCOUNTER — NON-APPOINTMENT (OUTPATIENT)
Age: 69
End: 2024-03-06

## 2024-03-06 VITALS
SYSTOLIC BLOOD PRESSURE: 130 MMHG | BODY MASS INDEX: 27.77 KG/M2 | RESPIRATION RATE: 16 BRPM | HEART RATE: 74 BPM | DIASTOLIC BLOOD PRESSURE: 80 MMHG | WEIGHT: 194 LBS | OXYGEN SATURATION: 98 % | HEIGHT: 70 IN | TEMPERATURE: 207.32 F

## 2024-03-06 DIAGNOSIS — R97.20 ELEVATED PROSTATE, SPECIFIC ANTIGEN [PSA]: ICD-10-CM

## 2024-03-06 DIAGNOSIS — R80.9 PROTEINURIA, UNSPECIFIED: ICD-10-CM

## 2024-03-06 DIAGNOSIS — E11.40 TYPE 2 DIABETES MELLITUS WITH DIABETIC NEUROPATHY, UNSPECIFIED: ICD-10-CM

## 2024-03-06 PROCEDURE — 93000 ELECTROCARDIOGRAM COMPLETE: CPT

## 2024-03-06 PROCEDURE — G0439: CPT

## 2024-03-06 RX ORDER — SUCRALFATE 1 G/1
1 TABLET ORAL
Qty: 90 | Refills: 1 | Status: DISCONTINUED | COMMUNITY
Start: 2022-02-03 | End: 2024-03-06

## 2024-03-06 NOTE — HEALTH RISK ASSESSMENT
[Excellent] : ~his/her~  mood as  excellent [Yes] : Yes [Monthly or less (1 pt)] : Monthly or less (1 point) [1 or 2 (0 pts)] : 1 or 2 (0 points) [Never (0 pts)] : Never (0 points) [No] : In the past 12 months have you used drugs other than those required for medical reasons? No [No falls in past year] : Patient reported no falls in the past year [0] : 2) Feeling down, depressed, or hopeless: Not at all (0) [Audit-CScore] : 1 [PHQ-2 Negative - No further assessment needed] : PHQ-2 Negative - No further assessment needed [[facility]] : 10 [[id number]] : 0 [Patient declined Low Dose CT Scan] : Patient declined Low Dose CT Scan [No Retinopathy] : No retinopathy [LowDoseCTScan] : 03/24 [EyeExamDate] : 06/23 [Patient reported colonoscopy was abnormal] : Patient reported colonoscopy was abnormal [HIV test declined] : HIV test declined [Change in mental status noted] : No change in mental status noted [Hepatitis C test declined] : Hepatitis C test declined [None] : None [With Significant Other] : lives with significant other [Significant Other] : lives with significant other [Retired] : retired [# Of Children ___] : has [unfilled] children [Fully functional (bathing, dressing, toileting, transferring, walking, feeding)] : Fully functional (bathing, dressing, toileting, transferring, walking, feeding) [Fully functional (using the telephone, shopping, preparing meals, housekeeping, doing laundry, using] : Fully functional and needs no help or supervision to perform IADLs (using the telephone, shopping, preparing meals, housekeeping, doing laundry, using transportation, managing medications and managing finances) [Reports changes in vision] : Reports no changes in vision [Reports changes in hearing] : Reports no changes in hearing [ColonoscopyDate] : 09/23 [Reviewed no changes] : Reviewed, no changes [AdvancecareDate] : 03/23 [Current] : Current [20 or more] : 20 or more

## 2024-03-06 NOTE — COUNSELING
[Cessation strategies including cessation program discussed] : Cessation strategies including cessation program discussed [Yes] : Risk of tobacco use and health benefits of smoking cessation discussed: Yes [Encouraged to pick a quit date and identify support needed to quit] : Encouraged to pick a quit date and identify support needed to quit [Use of nicotine replacement therapies and other medications discussed] : Use of nicotine replacement therapies and other medications discussed [No] : Not willing to quit smoking

## 2024-03-06 NOTE — HISTORY OF PRESENT ILLNESS
[FreeTextEntry1] : annual well check  [de-identified] : -PMH: T2DM, HLD, GERD w/ Manzano's, Generalized OA (Pre-dominantly b/l Knee's), DDD (Mod R. L4-L5 Foraminal Narrowing) -SH: Significant other. No children. Current smoker. Occasional EtOH use.  MIRYAM is a 68 year M whom is here today for an annual well check   Specialists Involved: -Pain Mgt: Dr. Medina  -GI: Dr. Miguel Bedoya  -Endo: Dr. Mary Sosa -Uro: Dr. Ed Reyes -Cardio: Consult pending  -Vaccines: Needs PPSV 23, Shingles, TDap (Declines at this time) -Colonoscopy: 9/2023. +Adenoma. Repeat 5yrs -Lung CT CA Screening: Declines -FH of Prostate, Colon, breast or ovarian CA: None known  -T2DM: Remains on Metformin 1000mg BID & Jardiance 25mg QD. On ASA & Statin. Compliant w/ BG monitoring. On ASA & Statin. (2/2024) A1c 6.5. (6/2023) Optho: No Retinopathy. Follows w/ Endo. -HTN: Remains on Lisinopril 10mg BID -HLD: Remains on Crestor 10mg HS.  -GERD w/ Manzano's: Remains on Omeprazole 40mg BID. (2/2022) EGD: Gastritis & Manzano's.  -Generalized OA (Pre-dominantly b/l Knee's), DDD (Mod R. L4-L5 Foraminal Narrowing): S/p Epidural Injection. Following w/ Pain Mgt. -Long Standing Smoking Hx: On and off over the last 40yrs. Currently quit 1mo ago.

## 2024-03-06 NOTE — ASSESSMENT
[FreeTextEntry1] : -PMH: T2DM, HLD, GERD w/ Manzano's, Generalized OA (Pre-dominantly b/l Knee's), DDD (Mod R. L4-L5 Foraminal Narrowing) -SH: Significant other. No children. Current smoker. Occasional EtOH use.  MIRYAM is a 68 year M whom is here today for an annual well check   Specialists Involved: -Pain Mgt: Dr. Medina  -GI: Dr. Miguel Bedoya  -Endo: Dr. Mary Sosa -Uro: Dr. Ed Reyes -Cardio: Consult pending  EKG obtained in office today demonstrates Sinus Tachycardia. normal axis/Intervals. Poor-R-wave progression. Normal EKG.   -Continue annual screening w/ Optho (DM) -Still needs to consult w/ Cardio (Sinus Tachycardia, DM, Smoker) -Continue f/u w/ GI (h/o Manzano's) -Continue f/u w/ Endo (DM) -RTO 6mo for routine f/u & labs or sooner if needed

## 2024-03-06 NOTE — PHYSICAL EXAM
[Well Nourished] : well nourished [No Acute Distress] : no acute distress [Well Developed] : well developed [Well-Appearing] : well-appearing [Normal Sclera/Conjunctiva] : normal sclera/conjunctiva [PERRL] : pupils equal round and reactive to light [EOMI] : extraocular movements intact [Normal Oropharynx] : the oropharynx was normal [Normal Outer Ear/Nose] : the outer ears and nose were normal in appearance [No JVD] : no jugular venous distention [No Lymphadenopathy] : no lymphadenopathy [Thyroid Normal, No Nodules] : the thyroid was normal and there were no nodules present [Supple] : supple [No Respiratory Distress] : no respiratory distress  [No Accessory Muscle Use] : no accessory muscle use [Normal Rate] : normal rate  [Clear to Auscultation] : lungs were clear to auscultation bilaterally [Regular Rhythm] : with a regular rhythm [No Murmur] : no murmur heard [Normal S1, S2] : normal S1 and S2 [No Carotid Bruits] : no carotid bruits [No Abdominal Bruit] : a ~M bruit was not heard ~T in the abdomen [No Varicosities] : no varicosities [Pedal Pulses Present] : the pedal pulses are present [No Edema] : there was no peripheral edema [No Palpable Aorta] : no palpable aorta [No Extremity Clubbing/Cyanosis] : no extremity clubbing/cyanosis [Soft] : abdomen soft [Non Tender] : non-tender [Non-distended] : non-distended [No Masses] : no abdominal mass palpated [No HSM] : no HSM [No CVA Tenderness] : no CVA  tenderness [Normal Bowel Sounds] : normal bowel sounds [No Joint Swelling] : no joint swelling [No Spinal Tenderness] : no spinal tenderness [No Rash] : no rash [Grossly Normal Strength/Tone] : grossly normal strength/tone [Coordination Grossly Intact] : coordination grossly intact [No Focal Deficits] : no focal deficits [Normal Gait] : normal gait [Deep Tendon Reflexes (DTR)] : deep tendon reflexes were 2+ and symmetric [Normal Affect] : the affect was normal [Normal Insight/Judgement] : insight and judgment were intact [None] : no ulcers in either foot were found [] : both feet [TWNoteComboBox3] : +2

## 2024-04-05 ENCOUNTER — APPOINTMENT (OUTPATIENT)
Dept: ENDOCRINOLOGY | Facility: CLINIC | Age: 69
End: 2024-04-05
Payer: MEDICARE

## 2024-04-05 VITALS
HEIGHT: 70 IN | SYSTOLIC BLOOD PRESSURE: 144 MMHG | WEIGHT: 190 LBS | DIASTOLIC BLOOD PRESSURE: 86 MMHG | BODY MASS INDEX: 27.2 KG/M2 | HEART RATE: 75 BPM | OXYGEN SATURATION: 97 %

## 2024-04-05 DIAGNOSIS — I10 ESSENTIAL (PRIMARY) HYPERTENSION: ICD-10-CM

## 2024-04-05 DIAGNOSIS — E11.69 TYPE 2 DIABETES MELLITUS WITH OTHER SPECIFIED COMPLICATION: ICD-10-CM

## 2024-04-05 DIAGNOSIS — E11.9 TYPE 2 DIABETES MELLITUS W/OUT COMPLICATIONS: ICD-10-CM

## 2024-04-05 DIAGNOSIS — E78.5 TYPE 2 DIABETES MELLITUS WITH OTHER SPECIFIED COMPLICATION: ICD-10-CM

## 2024-04-05 LAB — GLUCOSE BLDC GLUCOMTR-MCNC: 157

## 2024-04-05 PROCEDURE — 82962 GLUCOSE BLOOD TEST: CPT

## 2024-04-05 PROCEDURE — 99214 OFFICE O/P EST MOD 30 MIN: CPT

## 2024-04-05 RX ORDER — EMPAGLIFLOZIN 25 MG/1
25 TABLET, FILM COATED ORAL
Qty: 90 | Refills: 1 | Status: ACTIVE | COMMUNITY
Start: 2021-12-29 | End: 1900-01-01

## 2024-04-05 RX ORDER — LISINOPRIL 20 MG/1
20 TABLET ORAL DAILY
Qty: 3 | Refills: 1 | Status: ACTIVE | COMMUNITY
Start: 2023-01-10 | End: 1900-01-01

## 2024-04-05 RX ORDER — METFORMIN HYDROCHLORIDE 1000 MG/1
1000 TABLET, COATED ORAL TWICE DAILY
Qty: 180 | Refills: 3 | Status: ACTIVE | COMMUNITY
Start: 2022-04-05 | End: 1900-01-01

## 2024-04-05 NOTE — HISTORY OF PRESENT ILLNESS
[FreeTextEntry1] : Devaughn is a 68 yr old male, who presents today for follow up in regards type 2  diabetes.   Was seen by Dr. Warren in 4/22. Per patient , has been diabetic  since 12/21, his a1c was 12.2% then, but he think he was undiagnosed for some time..  He had bad sciatica pain, was given steroid injections for a few months before he was diagnosed.  He says he was 270 pounds 8 years ago, he started working on it and lost 100 pounjds, now gained some back.  Currently taking metformin 1000 mg bid, jardiance 10 mg daily  A1C  7.8% in 4/22 , 6.4% in 1/23, 7% in 9/23, 6.5% in 2/24  Home Glucose Monitoring Frequency: once a day at different times. BG Documentation:   BG Readings -fasting   90s to 110s, postprandials not very often: 150s  Diet is good,  mostly eats healthy,but does eat lots of carbs  Diabetic History ER Visits:  none Hospitalizations:   none Diet Therapy: watching his diet Diabetic Education:   no Exercise: very active for his age. Last eye exam: had it  in 6/23, no changes.

## 2024-04-05 NOTE — REVIEW OF SYSTEMS
[Recent Weight Gain (___ Lbs)] : recent weight gain: [unfilled] lbs [Joint Pain] : joint pain [Back Pain] : back pain [Pain/Numbness of Digits] : pain/numbness of digits [Fatigue] : no fatigue [Decreased Appetite] : appetite not decreased [Recent Weight Loss (___ Lbs)] : no recent weight loss [Visual Field Defect] : no visual field defect [Dry Eyes] : no dryness [Eye Pain] : no pain [Dysphagia] : no dysphagia [Neck Pain] : no neck pain [Chest Pain] : no chest pain [Palpitations] : no palpitations [Lower Ext Edema] : no lower extremity edema [Shortness Of Breath] : no shortness of breath [Cough] : no cough [Nausea] : no nausea [Constipation] : no constipation [Vomiting] : no vomiting [Diarrhea] : no diarrhea [Polyuria] : no polyuria [Dysuria] : no dysuria [Myalgia] : no myalgia  [Acanthosis] : no acanthosis  [Dry Skin] : no dry skin [Headaches] : no headaches [Dizziness] : no dizziness [Tremors] : no tremors [Insomnia] : no insomnia [Anxiety] : no anxiety [Cold Intolerance] : no cold intolerance [Easy Bruising] : no tendency for easy bruising

## 2024-04-05 NOTE — ASSESSMENT
[FreeTextEntry1] :  Devaughn is a 68 yr old male, who presents today for follow up in regards type 2  diabetes.    He had bad sciatica pain.   Currently taking metformin 1000 mg bid, jardiance 10 mg daily  A1C  7.8% in 4/22 , 6.4% in 1/23, 7% in 9/23, 6.5% in 2/24  Home Glucose Monitoring BG Readings -fasting   90s to 110s, postprandials not very often: 150s   Medication changes: None this visit.   To check sugars once a day at different times.  Diet and exercise reviewed.  Hypoglycemia reviewed.   Eyes: no active issues, to check for retinopathy  Feet: no active issues, no neuropathy. Diabetic foot care reviewed.   Lipids: on statin/ anti-lipid treatment. Last LDL: 84   Renal/ B/P : B/P wnl , on ACE/ ARB, has  albuminuria. will go up on dose of lisinopril and  repeat next visit.  Weight: Overweight.lost 100 pounds in last few years. Now gained 20 back, wants to work on his diet first.   Balanced diet and exercise reviewed.     Diabetes counselling:  The patient was counseled on diabetes foot care, long term vascular complications of diabetes, carbohydrate consistent diet, importance of diet and exercise to improve glycemic control, achieve weight loss and improve cardiovascular health and action and use of short and long-acting insulin. Patient was referred to ophthalmology for retinopathy screening. ADA diet (30-50 gm carbohydrates with each meal, 15 grams with snacks. Balance with lean proteins and low fat diet.) Exercise daily per ability, work up to 30 minutes a day. Medication, risks and benefits reviewed. Glucose testing and insulin administration for glycemic management.    FOLLOWUP@ 6 months

## 2024-04-05 NOTE — PHYSICAL EXAM
[Alert] : alert [PERRL] : pupils equal, round and reactive to light [No Neck Mass] : no neck mass was observed [Supple] : the neck was supple [Thyroid Not Enlarged] : the thyroid was not enlarged [No Thyroid Nodules] : no palpable thyroid nodules [No Respiratory Distress] : no respiratory distress [No Accessory Muscle Use] : no accessory muscle use [Clear to Auscultation] : lungs were clear to auscultation bilaterally [Normal S1, S2] : normal S1 and S2 [No Murmurs] : no murmurs [Normal Rate] : heart rate was normal [Right foot was examined, including] : right foot ~C was examined, including visual inspection with sensory and pulse exams [Left foot was examined, including] : left foot ~C was examined, including visual inspection with sensory and pulse exams [Normal] : normal [No Tremors] : no tremors [Oriented x3] : oriented to person, place, and time [Normal Affect] : the affect was normal [Normal Insight/Judgement] : insight and judgment were intact [Normal Mood] : the mood was normal [Diminished Throughout Both Feet] : normal tactile sensation with monofilament testing throughout both feet

## 2024-05-28 ENCOUNTER — RX RENEWAL (OUTPATIENT)
Age: 69
End: 2024-05-28

## 2024-05-28 RX ORDER — ROSUVASTATIN CALCIUM 10 MG/1
10 TABLET, FILM COATED ORAL
Qty: 90 | Refills: 3 | Status: ACTIVE | COMMUNITY
Start: 2021-12-29 | End: 1900-01-01

## 2024-05-28 RX ORDER — LISINOPRIL 10 MG/1
10 TABLET ORAL
Qty: 180 | Refills: 1 | Status: ACTIVE | COMMUNITY
Start: 2024-05-28 | End: 1900-01-01

## 2024-07-06 ENCOUNTER — NON-APPOINTMENT (OUTPATIENT)
Age: 69
End: 2024-07-06

## 2024-07-12 ENCOUNTER — NON-APPOINTMENT (OUTPATIENT)
Age: 69
End: 2024-07-12

## 2024-09-03 ENCOUNTER — APPOINTMENT (OUTPATIENT)
Dept: INTERNAL MEDICINE | Facility: CLINIC | Age: 69
End: 2024-09-03
Payer: MEDICARE

## 2024-09-03 VITALS
HEIGHT: 70 IN | OXYGEN SATURATION: 98 % | BODY MASS INDEX: 26.63 KG/M2 | DIASTOLIC BLOOD PRESSURE: 88 MMHG | RESPIRATION RATE: 16 BRPM | TEMPERATURE: 206.96 F | HEART RATE: 70 BPM | WEIGHT: 186 LBS | SYSTOLIC BLOOD PRESSURE: 120 MMHG

## 2024-09-03 DIAGNOSIS — E11.69 TYPE 2 DIABETES MELLITUS WITH OTHER SPECIFIED COMPLICATION: ICD-10-CM

## 2024-09-03 DIAGNOSIS — E11.9 TYPE 2 DIABETES MELLITUS W/OUT COMPLICATIONS: ICD-10-CM

## 2024-09-03 DIAGNOSIS — R97.20 ELEVATED PROSTATE, SPECIFIC ANTIGEN [PSA]: ICD-10-CM

## 2024-09-03 DIAGNOSIS — I10 ESSENTIAL (PRIMARY) HYPERTENSION: ICD-10-CM

## 2024-09-03 DIAGNOSIS — R80.9 PROTEINURIA, UNSPECIFIED: ICD-10-CM

## 2024-09-03 DIAGNOSIS — E78.5 TYPE 2 DIABETES MELLITUS WITH OTHER SPECIFIED COMPLICATION: ICD-10-CM

## 2024-09-03 DIAGNOSIS — E66.3 OVERWEIGHT: ICD-10-CM

## 2024-09-03 PROCEDURE — 99214 OFFICE O/P EST MOD 30 MIN: CPT

## 2024-09-03 PROCEDURE — 36415 COLL VENOUS BLD VENIPUNCTURE: CPT

## 2024-09-03 PROCEDURE — G2211 COMPLEX E/M VISIT ADD ON: CPT

## 2024-09-03 NOTE — PHYSICAL EXAM
[Normal] : no carotid or abdominal bruits heard, no varicosities, pedal pulses are present, no peripheral edema, no extremity clubbing or cyanosis and no palpable aorta
[Normal] : no carotid or abdominal bruits heard, no varicosities, pedal pulses are present, no peripheral edema, no extremity clubbing or cyanosis and no palpable aorta
Admission

## 2024-09-04 LAB
ALBUMIN SERPL ELPH-MCNC: 4.5 G/DL
ALP BLD-CCNC: 86 U/L
ALT SERPL-CCNC: 10 U/L
ANION GAP SERPL CALC-SCNC: 13 MMOL/L
AST SERPL-CCNC: 14 U/L
BILIRUB SERPL-MCNC: 0.6 MG/DL
BUN SERPL-MCNC: 23 MG/DL
CALCIUM SERPL-MCNC: 9.8 MG/DL
CHLORIDE SERPL-SCNC: 106 MMOL/L
CHOLEST SERPL-MCNC: 145 MG/DL
CO2 SERPL-SCNC: 25 MMOL/L
CREAT SERPL-MCNC: 0.8 MG/DL
EGFR: 95 ML/MIN/1.73M2
GLUCOSE SERPL-MCNC: 128 MG/DL
HDLC SERPL-MCNC: 53 MG/DL
LDLC SERPL CALC-MCNC: 77 MG/DL
NONHDLC SERPL-MCNC: 92 MG/DL
POTASSIUM SERPL-SCNC: 4.7 MMOL/L
PROT SERPL-MCNC: 6.9 G/DL
SODIUM SERPL-SCNC: 144 MMOL/L
TRIGL SERPL-MCNC: 71 MG/DL

## 2024-09-05 LAB
CREAT SPEC-SCNC: 87 MG/DL
ESTIMATED AVERAGE GLUCOSE: 137 MG/DL
HBA1C MFR BLD HPLC: 6.4 %
MICROALBUMIN 24H UR DL<=1MG/L-MCNC: 89 MG/DL
MICROALBUMIN/CREAT 24H UR-RTO: 1022 MG/G
PSA SERPL-MCNC: 5.1 NG/ML
VIT B12 SERPL-MCNC: 335 PG/ML

## 2024-09-05 NOTE — ASSESSMENT
[FreeTextEntry1] : -PMH: T2DM, HLD, GERD w/ Manzano's, Generalized OA (Pre-dominantly b/l Knee's), DDD (Mod R. L4-L5 Foraminal Narrowing) -SH: Significant other. No children. Current smoker. Occasional EtOH use.  MIRYAM is a 69 year M whom is here today  Specialists Involved: -Pain Mgt: Dr. Medina  -GI: Dr. Miguel Bedoya  -Endo: Dr. Mary Sosa -Uro: Dr. Ed Reyes -Cardio: Consult pending  -F/u labs drawn in office today -Further recs pending lab results -Continue annual screening w/ Optho (DM) -Still needs to consult w/ Cardio (Sinus Tachycardia, DM, Smoker) -Continue f/u w/ GI (h/o Manzano's) -Continue f/u w/ Endo (DM) -RTO March for CPE sooner if needed

## 2024-09-05 NOTE — HISTORY OF PRESENT ILLNESS
[FreeTextEntry1] : T2DM, HTN, HLD, Proteinuria [de-identified] : -PMH: T2DM, HTN, HLD, Proteinuria, GERD w/ Manzano's, Generalized OA (Pre-dominantly b/l Knee's), DDD (Mod R. L4-L5 Foraminal Narrowing) -SH: Significant other. No children. Current smoker. Occasional EtOH use.  MIRYAM is a 69 year M whom is here today for f/u DM, HLD, GERD, HTN  -T2DM: Remains on Metformin 1000mg BID & Jardiance 25mg QD. On ASA & Statin. Compliant w/ BG monitoring. On ASA & Statin. (2/2024) A1c 6.5. (6/2023) Optho: No Retinopathy. Follows w/ Endo. -HTN: Remains on Lisinopril 25mg QD -HLD: Remains on Crestor 10mg HS.  -GERD w/ Manzano's: Remains on Omeprazole 40mg BID. (2/2022) EGD: Gastritis & Manzano's.  -Generalized OA (Pre-dominantly b/l Knee's), DDD (Mod R. L4-L5 Foraminal Narrowing): S/p Epidural Injection. Following w/ Pain Mgt. -Long Standing Smoking Hx: On and off over the last 40yrs. Currently quit 1mo ago.

## 2024-09-05 NOTE — HISTORY OF PRESENT ILLNESS
[FreeTextEntry1] : T2DM, HTN, HLD, Proteinuria [de-identified] : -PMH: T2DM, HTN, HLD, Proteinuria, GERD w/ Manzano's, Generalized OA (Pre-dominantly b/l Knee's), DDD (Mod R. L4-L5 Foraminal Narrowing) -SH: Significant other. No children. Current smoker. Occasional EtOH use.  MIRYAM is a 69 year M whom is here today for f/u DM, HLD, GERD, HTN  -T2DM: Remains on Metformin 1000mg BID & Jardiance 25mg QD. On ASA & Statin. Compliant w/ BG monitoring. On ASA & Statin. (2/2024) A1c 6.5. (6/2023) Optho: No Retinopathy. Follows w/ Endo. -HTN: Remains on Lisinopril 25mg QD -HLD: Remains on Crestor 10mg HS.  -GERD w/ Manzano's: Remains on Omeprazole 40mg BID. (2/2022) EGD: Gastritis & Manzano's.  -Generalized OA (Pre-dominantly b/l Knee's), DDD (Mod R. L4-L5 Foraminal Narrowing): S/p Epidural Injection. Following w/ Pain Mgt. -Long Standing Smoking Hx: On and off over the last 40yrs. Currently quit 1mo ago.

## 2024-09-05 NOTE — ADDENDUM
[FreeTextEntry1] : Labs all good aside the following:  A1c 6.4 indicating well-controlled type 2 diabetes  Persistent macroscopic proteinuria for which I recommend he obtain a renal ultrasound and additional urine studies to further evaluate as well as consult with nephrology  PSA remains elevated at 5.1 Continued follow-up with urology as recommended  His B12 level is low normal Start over-the-counter vitamin B12 1000 mcg daily  Please have patient follow-up with me in office in 3 months sooner if needed

## 2024-10-04 ENCOUNTER — APPOINTMENT (OUTPATIENT)
Dept: ENDOCRINOLOGY | Facility: CLINIC | Age: 69
End: 2024-10-04
Payer: MEDICARE

## 2024-10-04 VITALS
SYSTOLIC BLOOD PRESSURE: 142 MMHG | BODY MASS INDEX: 26.48 KG/M2 | DIASTOLIC BLOOD PRESSURE: 82 MMHG | HEIGHT: 70 IN | HEART RATE: 72 BPM | OXYGEN SATURATION: 97 % | WEIGHT: 185 LBS

## 2024-10-04 DIAGNOSIS — I10 ESSENTIAL (PRIMARY) HYPERTENSION: ICD-10-CM

## 2024-10-04 DIAGNOSIS — E78.5 TYPE 2 DIABETES MELLITUS WITH OTHER SPECIFIED COMPLICATION: ICD-10-CM

## 2024-10-04 DIAGNOSIS — E11.69 TYPE 2 DIABETES MELLITUS WITH OTHER SPECIFIED COMPLICATION: ICD-10-CM

## 2024-10-04 DIAGNOSIS — E11.9 TYPE 2 DIABETES MELLITUS W/OUT COMPLICATIONS: ICD-10-CM

## 2024-10-04 LAB — GLUCOSE BLDC GLUCOMTR-MCNC: 136

## 2024-10-04 PROCEDURE — 99214 OFFICE O/P EST MOD 30 MIN: CPT

## 2024-10-04 PROCEDURE — 82962 GLUCOSE BLOOD TEST: CPT

## 2024-10-04 NOTE — HISTORY OF PRESENT ILLNESS
[FreeTextEntry1] : Devaughn is a 69 yr old male, who presents today for follow up in regards type 2  diabetes.   Was seen by Dr. Warren in 4/22. Per patient , has been diabetic  since 12/21, his a1c was 12.2% then, but he think he was undiagnosed for some time..  He had bad sciatica pain, was given steroid injections for a few months before he was diagnosed.  He says he was 270 pounds 8 years ago, he started working on it and lost 100 pounds, now gained some back.  Currently taking metformin 1000 mg bid, jardiance 25 mg daily  A1C  7.8% in 4/22 , 6.4% in 1/23, 7% in 9/23, 6.5% in 2/24, 6.4% in 9/24  Home Glucose Monitoring Frequency: once a day at different times. BG Documentation:   BG Readings -fasting   90s to 110s, postprandials : mostly below 150s  Diet is good,  mostly eats healthy, but does eat lots of carbs Lost 8 pounds.  Diabetic History ER Visits:  none Hospitalizations:   none Diet Therapy: watching his diet Diabetic Education:   no Exercise: very active for his age. Last eye exam: had it  in 6/24, no changes.

## 2024-10-04 NOTE — ASSESSMENT
[FreeTextEntry1] : Devaughn is a 69 yr old male, who presents today for follow up in regards type 2  diabetes.     Currently taking metformin 1000 mg bid, jardiance 25 mg daily  A1C  7.8% in 4/22 , 6.4% in 1/23, 7% in 9/23, 6.5% in 2/24, 6.4% in 9/24    Medication changes: None this visit.   To check sugars once a day at different times.  Diet and exercise reviewed.  Hypoglycemia reviewed.   Eyes: no active issues, to check for retinopathy  Feet: no active issues, no neuropathy. Diabetic foot care reviewed.   Lipids: on statin/ anti-lipid treatment. Last LDL: 84   Renal/ B/P : B/P wnl , on ACE/ ARB, has  albuminuria, worsening this time. we increased dose  of lisinopril last visit. He takes lot of ibuprofen , discussdd to stop and replace with tylenol, hydration will  repeat next visit. If still going up will then refer tonephrologist  Weight: Overweight.lost 100 pounds in last few years. Now gained 20 back,now lost 8 pounds again   Balanced diet and exercise reviewed.    Diabetes counselling:  The patient was counseled on diabetes foot care, long term vascular complications of diabetes, carbohydrate consistent diet, importance of diet and exercise to improve glycemic control, achieve weight loss and improve cardiovascular health and action and use of short and long-acting insulin. Patient was referred to ophthalmology for retinopathy screening. ADA diet (30-50 gm carbohydrates with each meal, 15 grams with snacks. Balance with lean proteins and low fat diet.) Exercise daily per ability, work up to 30 minutes a day. Medication, risks and benefits reviewed. Glucose testing and insulin administration for glycemic management.    FOLLOWUP@ 4 months

## 2024-10-04 NOTE — PHYSICAL EXAM
[Alert] : alert [PERRL] : pupils equal, round and reactive to light [No Neck Mass] : no neck mass was observed [Thyroid Not Enlarged] : the thyroid was not enlarged [No Thyroid Nodules] : no palpable thyroid nodules [No Respiratory Distress] : no respiratory distress [No Accessory Muscle Use] : no accessory muscle use [Clear to Auscultation] : lungs were clear to auscultation bilaterally [Normal S1, S2] : normal S1 and S2 [Normal Rate] : heart rate was normal [Right foot was examined, including] : right foot ~C was examined, including visual inspection with sensory and pulse exams [Left foot was examined, including] : left foot ~C was examined, including visual inspection with sensory and pulse exams [Normal] : normal [No Tremors] : no tremors [Oriented x3] : oriented to person, place, and time [Diminished Throughout Both Feet] : normal tactile sensation with monofilament testing throughout both feet

## 2024-11-19 ENCOUNTER — RX RENEWAL (OUTPATIENT)
Age: 69
End: 2024-11-19

## 2024-11-19 RX ORDER — LISINOPRIL 10 MG/1
10 TABLET ORAL
Qty: 180 | Refills: 1 | Status: ACTIVE | COMMUNITY
Start: 2024-11-19 | End: 1900-01-01

## 2025-01-22 ENCOUNTER — APPOINTMENT (OUTPATIENT)
Dept: MRI IMAGING | Facility: CLINIC | Age: 70
End: 2025-01-22
Payer: MEDICARE

## 2025-01-22 ENCOUNTER — OUTPATIENT (OUTPATIENT)
Dept: OUTPATIENT SERVICES | Facility: HOSPITAL | Age: 70
LOS: 1 days | End: 2025-01-22
Payer: MEDICARE

## 2025-01-22 PROCEDURE — 72195 MRI PELVIS W/O DYE: CPT | Mod: MH

## 2025-01-22 PROCEDURE — 72195 MRI PELVIS W/O DYE: CPT | Mod: 26

## 2025-02-10 ENCOUNTER — RX RENEWAL (OUTPATIENT)
Age: 70
End: 2025-02-10

## 2025-02-19 ENCOUNTER — NON-APPOINTMENT (OUTPATIENT)
Age: 70
End: 2025-02-19

## 2025-02-19 ENCOUNTER — APPOINTMENT (OUTPATIENT)
Dept: ENDOCRINOLOGY | Facility: CLINIC | Age: 70
End: 2025-02-19
Payer: MEDICARE

## 2025-02-19 VITALS
DIASTOLIC BLOOD PRESSURE: 80 MMHG | HEIGHT: 70 IN | OXYGEN SATURATION: 97 % | WEIGHT: 197 LBS | SYSTOLIC BLOOD PRESSURE: 124 MMHG | HEART RATE: 71 BPM | BODY MASS INDEX: 28.2 KG/M2

## 2025-02-19 DIAGNOSIS — E11.69 TYPE 2 DIABETES MELLITUS WITH OTHER SPECIFIED COMPLICATION: ICD-10-CM

## 2025-02-19 DIAGNOSIS — E78.5 TYPE 2 DIABETES MELLITUS WITH OTHER SPECIFIED COMPLICATION: ICD-10-CM

## 2025-02-19 DIAGNOSIS — R80.9 PROTEINURIA, UNSPECIFIED: ICD-10-CM

## 2025-02-19 DIAGNOSIS — E11.9 TYPE 2 DIABETES MELLITUS W/OUT COMPLICATIONS: ICD-10-CM

## 2025-02-19 DIAGNOSIS — I10 ESSENTIAL (PRIMARY) HYPERTENSION: ICD-10-CM

## 2025-02-19 DIAGNOSIS — E66.3 OVERWEIGHT: ICD-10-CM

## 2025-02-19 LAB — GLUCOSE BLDC GLUCOMTR-MCNC: 108

## 2025-02-19 PROCEDURE — 82962 GLUCOSE BLOOD TEST: CPT

## 2025-02-19 PROCEDURE — 99214 OFFICE O/P EST MOD 30 MIN: CPT

## 2025-02-28 ENCOUNTER — RX RENEWAL (OUTPATIENT)
Age: 70
End: 2025-02-28

## 2025-03-11 ENCOUNTER — APPOINTMENT (OUTPATIENT)
Dept: INTERNAL MEDICINE | Facility: CLINIC | Age: 70
End: 2025-03-11

## 2025-04-02 ENCOUNTER — APPOINTMENT (OUTPATIENT)
Dept: NEPHROLOGY | Facility: CLINIC | Age: 70
End: 2025-04-02
Payer: MEDICARE

## 2025-04-02 ENCOUNTER — NON-APPOINTMENT (OUTPATIENT)
Age: 70
End: 2025-04-02

## 2025-04-02 ENCOUNTER — APPOINTMENT (OUTPATIENT)
Dept: NEPHROLOGY | Facility: CLINIC | Age: 70
End: 2025-04-02

## 2025-04-02 VITALS
DIASTOLIC BLOOD PRESSURE: 92 MMHG | HEART RATE: 90 BPM | BODY MASS INDEX: 27.55 KG/M2 | OXYGEN SATURATION: 95 % | WEIGHT: 192 LBS | SYSTOLIC BLOOD PRESSURE: 132 MMHG

## 2025-04-02 DIAGNOSIS — E11.9 TYPE 2 DIABETES MELLITUS W/OUT COMPLICATIONS: ICD-10-CM

## 2025-04-02 DIAGNOSIS — N18.2 TYPE 2 DIABETES MELLITUS WITH DIABETIC CHRONIC KIDNEY DISEASE: ICD-10-CM

## 2025-04-02 DIAGNOSIS — E11.22 TYPE 2 DIABETES MELLITUS WITH DIABETIC CHRONIC KIDNEY DISEASE: ICD-10-CM

## 2025-04-02 DIAGNOSIS — I10 ESSENTIAL (PRIMARY) HYPERTENSION: ICD-10-CM

## 2025-04-02 DIAGNOSIS — R80.9 PROTEINURIA, UNSPECIFIED: ICD-10-CM

## 2025-04-02 PROCEDURE — 99203 OFFICE O/P NEW LOW 30 MIN: CPT

## 2025-04-03 LAB
BILIRUB UR QL STRIP: NEGATIVE
CLARITY UR: NORMAL
COLLECTION METHOD: NORMAL
GLUCOSE UR-MCNC: 1000
HCG UR QL: 0.2 EU/DL
HGB UR QL STRIP.AUTO: ABNORMAL
KETONES UR-MCNC: NEGATIVE
LEUKOCYTE ESTERASE UR QL STRIP: NEGATIVE
NITRITE UR QL STRIP: NEGATIVE
PH UR STRIP: 5
PROT UR STRIP-MCNC: 300
SP GR UR STRIP: 1.02

## 2025-04-14 ENCOUNTER — RX RENEWAL (OUTPATIENT)
Age: 70
End: 2025-04-14

## 2025-04-16 ENCOUNTER — LABORATORY RESULT (OUTPATIENT)
Age: 70
End: 2025-04-16

## 2025-05-08 ENCOUNTER — APPOINTMENT (OUTPATIENT)
Dept: NEPHROLOGY | Facility: CLINIC | Age: 70
End: 2025-05-08
Payer: MEDICARE

## 2025-05-08 VITALS
HEIGHT: 70 IN | RESPIRATION RATE: 14 BRPM | WEIGHT: 192 LBS | TEMPERATURE: 209.3 F | SYSTOLIC BLOOD PRESSURE: 134 MMHG | OXYGEN SATURATION: 97 % | BODY MASS INDEX: 27.49 KG/M2 | HEART RATE: 107 BPM | DIASTOLIC BLOOD PRESSURE: 78 MMHG

## 2025-05-08 DIAGNOSIS — R80.9 PROTEINURIA, UNSPECIFIED: ICD-10-CM

## 2025-05-08 DIAGNOSIS — E11.9 TYPE 2 DIABETES MELLITUS W/OUT COMPLICATIONS: ICD-10-CM

## 2025-05-08 DIAGNOSIS — E11.22 TYPE 2 DIABETES MELLITUS WITH DIABETIC CHRONIC KIDNEY DISEASE: ICD-10-CM

## 2025-05-08 DIAGNOSIS — N18.2 TYPE 2 DIABETES MELLITUS WITH DIABETIC CHRONIC KIDNEY DISEASE: ICD-10-CM

## 2025-05-08 DIAGNOSIS — E66.3 OVERWEIGHT: ICD-10-CM

## 2025-05-08 PROCEDURE — 99214 OFFICE O/P EST MOD 30 MIN: CPT

## 2025-07-28 ENCOUNTER — NON-APPOINTMENT (OUTPATIENT)
Age: 70
End: 2025-07-28

## 2025-07-28 DIAGNOSIS — M25.469 EFFUSION, UNSPECIFIED KNEE: ICD-10-CM

## 2025-08-05 ENCOUNTER — APPOINTMENT (OUTPATIENT)
Dept: ORTHOPEDIC SURGERY | Facility: CLINIC | Age: 70
End: 2025-08-05
Payer: MEDICARE

## 2025-08-05 VITALS
SYSTOLIC BLOOD PRESSURE: 153 MMHG | HEIGHT: 70 IN | BODY MASS INDEX: 26.48 KG/M2 | DIASTOLIC BLOOD PRESSURE: 92 MMHG | WEIGHT: 185 LBS

## 2025-08-05 DIAGNOSIS — M17.11 UNILATERAL PRIMARY OSTEOARTHRITIS, RIGHT KNEE: ICD-10-CM

## 2025-08-05 PROCEDURE — 20610 DRAIN/INJ JOINT/BURSA W/O US: CPT | Mod: RT

## 2025-08-05 PROCEDURE — 73564 X-RAY EXAM KNEE 4 OR MORE: CPT | Mod: RT

## 2025-08-05 PROCEDURE — 99204 OFFICE O/P NEW MOD 45 MIN: CPT | Mod: 25

## 2025-08-07 ENCOUNTER — RX RENEWAL (OUTPATIENT)
Age: 70
End: 2025-08-07

## 2025-08-07 RX ORDER — LISINOPRIL 20 MG/1
20 TABLET ORAL
Qty: 90 | Refills: 1 | Status: ACTIVE | COMMUNITY
Start: 2025-08-07 | End: 1900-01-01

## 2025-08-19 ENCOUNTER — APPOINTMENT (OUTPATIENT)
Dept: NEPHROLOGY | Facility: CLINIC | Age: 70
End: 2025-08-19

## 2025-08-21 ENCOUNTER — LABORATORY RESULT (OUTPATIENT)
Age: 70
End: 2025-08-21

## 2025-08-21 DIAGNOSIS — R97.20 ELEVATED PROSTATE, SPECIFIC ANTIGEN [PSA]: ICD-10-CM

## 2025-08-26 ENCOUNTER — APPOINTMENT (OUTPATIENT)
Dept: ENDOCRINOLOGY | Facility: CLINIC | Age: 70
End: 2025-08-26
Payer: MEDICARE

## 2025-08-26 VITALS
HEIGHT: 70 IN | SYSTOLIC BLOOD PRESSURE: 130 MMHG | WEIGHT: 190 LBS | DIASTOLIC BLOOD PRESSURE: 72 MMHG | OXYGEN SATURATION: 97 % | HEART RATE: 73 BPM | BODY MASS INDEX: 27.2 KG/M2

## 2025-08-26 DIAGNOSIS — E11.9 TYPE 2 DIABETES MELLITUS W/OUT COMPLICATIONS: ICD-10-CM

## 2025-08-26 DIAGNOSIS — R80.9 PROTEINURIA, UNSPECIFIED: ICD-10-CM

## 2025-08-26 DIAGNOSIS — E78.5 TYPE 2 DIABETES MELLITUS WITH OTHER SPECIFIED COMPLICATION: ICD-10-CM

## 2025-08-26 DIAGNOSIS — E11.69 TYPE 2 DIABETES MELLITUS WITH OTHER SPECIFIED COMPLICATION: ICD-10-CM

## 2025-08-26 LAB
GLUCOSE BLDC GLUCOMTR-MCNC: 147
HBA1C MFR BLD HPLC: 6.9

## 2025-08-26 PROCEDURE — 99214 OFFICE O/P EST MOD 30 MIN: CPT

## 2025-08-26 PROCEDURE — 82962 GLUCOSE BLOOD TEST: CPT

## 2025-08-27 ENCOUNTER — RX RENEWAL (OUTPATIENT)
Age: 70
End: 2025-08-27

## 2025-09-05 ENCOUNTER — RX RENEWAL (OUTPATIENT)
Age: 70
End: 2025-09-05

## 2025-09-05 RX ORDER — BLOOD SUGAR DIAGNOSTIC
STRIP MISCELLANEOUS
Qty: 100 | Refills: 1 | Status: ACTIVE | COMMUNITY
Start: 2025-09-05 | End: 1900-01-01

## 2025-09-16 ENCOUNTER — APPOINTMENT (OUTPATIENT)
Dept: ORTHOPEDIC SURGERY | Facility: CLINIC | Age: 70
End: 2025-09-16
Payer: MEDICARE

## 2025-09-16 VITALS
WEIGHT: 190 LBS | HEART RATE: 80 BPM | HEIGHT: 70 IN | SYSTOLIC BLOOD PRESSURE: 155 MMHG | BODY MASS INDEX: 27.2 KG/M2 | DIASTOLIC BLOOD PRESSURE: 89 MMHG

## 2025-09-16 DIAGNOSIS — M17.11 UNILATERAL PRIMARY OSTEOARTHRITIS, RIGHT KNEE: ICD-10-CM

## 2025-09-16 PROCEDURE — G2211 COMPLEX E/M VISIT ADD ON: CPT

## 2025-09-16 PROCEDURE — 99214 OFFICE O/P EST MOD 30 MIN: CPT

## (undated) DEVICE — VALVE ENDOSCOPE DEFENDO SINGLE USE

## (undated) DEVICE — FORCEP RADIAL JAW 4 W NDL 2.4MM 2.8MM 240CM ORANGE DISP

## (undated) DEVICE — FORCEP RADIAL JAW 4 240CM DISP

## (undated) DEVICE — VALVE ENDO SURESEAL II 0-5FR